# Patient Record
Sex: MALE | Race: WHITE | Employment: FULL TIME | URBAN - METROPOLITAN AREA
[De-identification: names, ages, dates, MRNs, and addresses within clinical notes are randomized per-mention and may not be internally consistent; named-entity substitution may affect disease eponyms.]

---

## 2017-04-15 DIAGNOSIS — I10 ESSENTIAL HYPERTENSION: ICD-10-CM

## 2017-04-15 DIAGNOSIS — F41.9 ANXIETY: ICD-10-CM

## 2017-04-17 RX ORDER — VALSARTAN AND HYDROCHLOROTHIAZIDE 160; 12.5 MG/1; MG/1
1 TABLET, FILM COATED ORAL DAILY
Qty: 90 TABLET | Refills: 0 | Status: SHIPPED | OUTPATIENT
Start: 2017-04-17 | End: 2017-07-07 | Stop reason: SDUPTHER

## 2017-04-17 RX ORDER — CITALOPRAM 20 MG/1
20 TABLET ORAL DAILY
Qty: 90 TABLET | Refills: 0 | Status: SHIPPED | OUTPATIENT
Start: 2017-04-17 | End: 2017-07-07 | Stop reason: SDUPTHER

## 2017-07-07 DIAGNOSIS — I10 ESSENTIAL HYPERTENSION: ICD-10-CM

## 2017-07-07 DIAGNOSIS — F41.9 ANXIETY: ICD-10-CM

## 2017-07-07 RX ORDER — CITALOPRAM 20 MG/1
20 TABLET ORAL DAILY
Qty: 90 TABLET | Refills: 0 | Status: SHIPPED | OUTPATIENT
Start: 2017-07-07 | End: 2017-08-02 | Stop reason: SDUPTHER

## 2017-07-07 RX ORDER — VALSARTAN AND HYDROCHLOROTHIAZIDE 160; 12.5 MG/1; MG/1
1 TABLET, FILM COATED ORAL DAILY
Qty: 90 TABLET | Refills: 0 | Status: SHIPPED | OUTPATIENT
Start: 2017-07-07 | End: 2017-08-02 | Stop reason: SDUPTHER

## 2017-08-02 ENCOUNTER — OFFICE VISIT (OUTPATIENT)
Dept: FAMILY MEDICINE CLINIC | Age: 47
End: 2017-08-02
Payer: COMMERCIAL

## 2017-08-02 VITALS
HEIGHT: 71 IN | HEART RATE: 80 BPM | DIASTOLIC BLOOD PRESSURE: 78 MMHG | RESPIRATION RATE: 20 BRPM | BODY MASS INDEX: 30.1 KG/M2 | SYSTOLIC BLOOD PRESSURE: 124 MMHG | TEMPERATURE: 97.5 F | WEIGHT: 215 LBS

## 2017-08-02 DIAGNOSIS — J30.1 SEASONAL ALLERGIC RHINITIS DUE TO POLLEN: ICD-10-CM

## 2017-08-02 DIAGNOSIS — F41.9 ANXIETY: ICD-10-CM

## 2017-08-02 DIAGNOSIS — R09.81 NASAL CONGESTION WITH RHINORRHEA: ICD-10-CM

## 2017-08-02 DIAGNOSIS — I10 ESSENTIAL HYPERTENSION: ICD-10-CM

## 2017-08-02 DIAGNOSIS — E78.5 HYPERLIPIDEMIA, UNSPECIFIED HYPERLIPIDEMIA TYPE: ICD-10-CM

## 2017-08-02 DIAGNOSIS — Z00.00 ANNUAL PHYSICAL EXAM: Primary | ICD-10-CM

## 2017-08-02 DIAGNOSIS — Z23 NEED FOR TDAP VACCINATION: ICD-10-CM

## 2017-08-02 DIAGNOSIS — J34.89 NASAL CONGESTION WITH RHINORRHEA: ICD-10-CM

## 2017-08-02 DIAGNOSIS — D64.9 ANEMIA, UNSPECIFIED TYPE: ICD-10-CM

## 2017-08-02 DIAGNOSIS — Z11.4 SCREENING FOR HIV (HUMAN IMMUNODEFICIENCY VIRUS): ICD-10-CM

## 2017-08-02 PROCEDURE — 99396 PREV VISIT EST AGE 40-64: CPT | Performed by: PEDIATRICS

## 2017-08-02 PROCEDURE — 90715 TDAP VACCINE 7 YRS/> IM: CPT | Performed by: PEDIATRICS

## 2017-08-02 PROCEDURE — 90471 IMMUNIZATION ADMIN: CPT | Performed by: PEDIATRICS

## 2017-08-02 RX ORDER — CITALOPRAM 20 MG/1
20 TABLET ORAL DAILY
Qty: 90 TABLET | Refills: 3 | Status: SHIPPED | OUTPATIENT
Start: 2017-08-02 | End: 2018-09-01 | Stop reason: SDUPTHER

## 2017-08-02 RX ORDER — ATORVASTATIN CALCIUM 20 MG
20 TABLET ORAL DAILY
Qty: 90 TABLET | Refills: 3 | Status: SHIPPED | OUTPATIENT
Start: 2017-08-02 | End: 2018-10-31 | Stop reason: SDUPTHER

## 2017-08-02 RX ORDER — VALSARTAN AND HYDROCHLOROTHIAZIDE 160; 12.5 MG/1; MG/1
1 TABLET, FILM COATED ORAL DAILY
Qty: 90 TABLET | Refills: 3 | Status: SHIPPED | OUTPATIENT
Start: 2017-08-02 | End: 2018-09-01 | Stop reason: SDUPTHER

## 2017-08-02 ASSESSMENT — ENCOUNTER SYMPTOMS
COUGH: 0
CONSTIPATION: 0
SHORTNESS OF BREATH: 0
DIARRHEA: 0
EYE ITCHING: 0
EYE DISCHARGE: 0
SINUS PRESSURE: 0
STRIDOR: 0
WHEEZING: 0
ABDOMINAL PAIN: 0
BLOOD IN STOOL: 0
VOICE CHANGE: 0
CHEST TIGHTNESS: 0
VOMITING: 0
BACK PAIN: 0
EYE PAIN: 0
NAUSEA: 0
COLOR CHANGE: 0
TROUBLE SWALLOWING: 0
RHINORRHEA: 1
CHOKING: 0

## 2017-08-02 ASSESSMENT — PATIENT HEALTH QUESTIONNAIRE - PHQ9
SUM OF ALL RESPONSES TO PHQ9 QUESTIONS 1 & 2: 0
1. LITTLE INTEREST OR PLEASURE IN DOING THINGS: 0
2. FEELING DOWN, DEPRESSED OR HOPELESS: 0
SUM OF ALL RESPONSES TO PHQ QUESTIONS 1-9: 0

## 2017-08-04 LAB
ALBUMIN SERPL-MCNC: 4.2 G/DL
ALP BLD-CCNC: 71 U/L
ALT SERPL-CCNC: 22 U/L
ANION GAP SERPL CALCULATED.3IONS-SCNC: 10 MMOL/L
AST SERPL-CCNC: 15 U/L
BASOPHILS ABSOLUTE: 0.1 /ΜL
BASOPHILS RELATIVE PERCENT: 0.9 %
BILIRUB SERPL-MCNC: 0.8 MG/DL (ref 0.1–1.4)
BUN BLDV-MCNC: 13 MG/DL
CALCIUM SERPL-MCNC: 9.5 MG/DL
CHLORIDE BLD-SCNC: 103 MMOL/L
CHOLESTEROL, TOTAL: 224 MG/DL
CHOLESTEROL/HDL RATIO: 5.5
CO2: 29 MMOL/L
CREAT SERPL-MCNC: 0.7 MG/DL
EOSINOPHILS ABSOLUTE: 0.2 /ΜL
EOSINOPHILS RELATIVE PERCENT: 3.5 %
FERRITIN: 162.8 NG/ML (ref 18–300)
GFR CALCULATED: 120
GLUCOSE BLD-MCNC: 99 MG/DL
HCT VFR BLD CALC: 40.8 % (ref 41–53)
HDLC SERPL-MCNC: 41 MG/DL (ref 35–70)
HEMOGLOBIN: 13.6 G/DL (ref 13.5–17.5)
IRON: 91
LDL CHOLESTEROL CALCULATED: 147 MG/DL (ref 0–160)
LYMPHOCYTES ABSOLUTE: 1.5 /ΜL
LYMPHOCYTES RELATIVE PERCENT: 27.9 %
MCH RBC QN AUTO: 30.3 PG
MCHC RBC AUTO-ENTMCNC: 33.3 G/DL
MCV RBC AUTO: 90.9 FL
MONOCYTES ABSOLUTE: 0.5 /ΜL
MONOCYTES RELATIVE PERCENT: 8.9 %
NEUTROPHILS ABSOLUTE: 3.1 /ΜL
NEUTROPHILS RELATIVE PERCENT: 58.2 %
PLATELET # BLD: 193 K/ΜL
PMV BLD AUTO: ABNORMAL FL
POTASSIUM SERPL-SCNC: 4.4 MMOL/L
RBC # BLD: 4.49 10^6/ΜL
SODIUM BLD-SCNC: 138 MMOL/L
TOTAL IRON BINDING CAPACITY: 364
TOTAL PROTEIN: 6.6
TRIGL SERPL-MCNC: 178 MG/DL
VLDLC SERPL CALC-MCNC: 36 MG/DL
WBC # BLD: 5.4 10^3/ML

## 2017-08-07 DIAGNOSIS — Z00.00 ANNUAL PHYSICAL EXAM: ICD-10-CM

## 2017-08-07 DIAGNOSIS — D64.9 ANEMIA, UNSPECIFIED TYPE: ICD-10-CM

## 2017-08-07 DIAGNOSIS — Z11.4 SCREENING FOR HIV (HUMAN IMMUNODEFICIENCY VIRUS): ICD-10-CM

## 2018-09-01 DIAGNOSIS — F41.9 ANXIETY: ICD-10-CM

## 2018-09-01 DIAGNOSIS — I10 ESSENTIAL HYPERTENSION: ICD-10-CM

## 2018-09-04 RX ORDER — CITALOPRAM 20 MG/1
20 TABLET ORAL DAILY
Qty: 90 TABLET | Refills: 3 | Status: SHIPPED | OUTPATIENT
Start: 2018-09-04 | End: 2019-07-27 | Stop reason: SDUPTHER

## 2018-09-04 RX ORDER — VALSARTAN AND HYDROCHLOROTHIAZIDE 160; 12.5 MG/1; MG/1
1 TABLET, FILM COATED ORAL DAILY
Qty: 90 TABLET | Refills: 3 | Status: SHIPPED | OUTPATIENT
Start: 2018-09-04 | End: 2019-07-27 | Stop reason: SDUPTHER

## 2018-09-04 NOTE — TELEPHONE ENCOUNTER
LOV:8-2-17  KKY:[birth year]  LRF:8-2-17  Health Maintenance   Topic Date Due    Flu vaccine (1) 09/01/2018    Lipid screen  08/04/2022    DTaP/Tdap/Td vaccine (2 - Td) 08/02/2027    HIV screen  Completed             (applicable per patient's age: Cancer Screenings, Depression Screening, Fall Risk Screening, Immunizations)    LDL Calculated (mg/dL)   Date Value   08/04/2017 147     AST (U/L)   Date Value   08/04/2017 15     ALT (U/L)   Date Value   08/04/2017 22     BUN (mg/dL)   Date Value   08/04/2017 13      (goal A1C is < 7)   (goal LDL is <100) need 30-50% reduction from baseline     BP Readings from Last 3 Encounters:   08/02/17 124/78   07/20/16 112/70   07/13/15 122/60    (goal /80)      All Future Testing planned in [facility]:      Next Visit Date:  Future Appointments  Date Time Provider Maria M Miller   10/15/2018 2:20 PM MD Radha Duncan   10/16/2019 2:20 PM MD Kelin Duncan            Patient Active Problem List:     Hyperlipidemia     Hypertension     Anxiety     Obsessive behaviors     Seasonal allergies

## 2018-10-15 ENCOUNTER — OFFICE VISIT (OUTPATIENT)
Dept: FAMILY MEDICINE CLINIC | Age: 48
End: 2018-10-15
Payer: COMMERCIAL

## 2018-10-15 VITALS
TEMPERATURE: 97.3 F | HEART RATE: 70 BPM | DIASTOLIC BLOOD PRESSURE: 74 MMHG | RESPIRATION RATE: 18 BRPM | HEIGHT: 71 IN | WEIGHT: 215 LBS | SYSTOLIC BLOOD PRESSURE: 120 MMHG | BODY MASS INDEX: 30.1 KG/M2

## 2018-10-15 DIAGNOSIS — G25.81 RESTLESS LEG SYNDROME: ICD-10-CM

## 2018-10-15 DIAGNOSIS — F41.9 ANXIETY: ICD-10-CM

## 2018-10-15 DIAGNOSIS — I10 ESSENTIAL HYPERTENSION: ICD-10-CM

## 2018-10-15 DIAGNOSIS — J30.1 SEASONAL ALLERGIC RHINITIS DUE TO POLLEN: ICD-10-CM

## 2018-10-15 DIAGNOSIS — D64.9 ANEMIA, UNSPECIFIED TYPE: ICD-10-CM

## 2018-10-15 DIAGNOSIS — Z00.00 ANNUAL PHYSICAL EXAM: Primary | ICD-10-CM

## 2018-10-15 DIAGNOSIS — E78.5 HYPERLIPIDEMIA, UNSPECIFIED HYPERLIPIDEMIA TYPE: ICD-10-CM

## 2018-10-15 DIAGNOSIS — Z12.5 SCREENING FOR PROSTATE CANCER: ICD-10-CM

## 2018-10-15 PROCEDURE — 99396 PREV VISIT EST AGE 40-64: CPT | Performed by: PEDIATRICS

## 2018-10-15 ASSESSMENT — ENCOUNTER SYMPTOMS
DIARRHEA: 0
TROUBLE SWALLOWING: 0
STRIDOR: 0
CHOKING: 0
COLOR CHANGE: 0
SHORTNESS OF BREATH: 0
CHEST TIGHTNESS: 0
BLOOD IN STOOL: 0
EYE ITCHING: 0
VOMITING: 0
NAUSEA: 0
BACK PAIN: 0
ABDOMINAL PAIN: 0
EYE DISCHARGE: 0
SINUS PRESSURE: 0
COUGH: 0
CONSTIPATION: 0
EYE PAIN: 0
VOICE CHANGE: 0
WHEEZING: 0

## 2018-10-15 ASSESSMENT — PATIENT HEALTH QUESTIONNAIRE - PHQ9
2. FEELING DOWN, DEPRESSED OR HOPELESS: 0
SUM OF ALL RESPONSES TO PHQ QUESTIONS 1-9: 0
1. LITTLE INTEREST OR PLEASURE IN DOING THINGS: 0
SUM OF ALL RESPONSES TO PHQ QUESTIONS 1-9: 0
SUM OF ALL RESPONSES TO PHQ9 QUESTIONS 1 & 2: 0

## 2018-10-15 NOTE — PROGRESS NOTES
Cardiovascular: Normal rate, regular rhythm and normal heart sounds. No murmur heard. Pulmonary/Chest: Effort normal and breath sounds normal. No stridor. No respiratory distress. He has no wheezes. He has no rales. He exhibits no tenderness. Abdominal: Soft. Bowel sounds are normal. He exhibits no distension and no mass. There is no tenderness. Musculoskeletal: Normal range of motion. He exhibits no edema or tenderness. Right knee: Normal.        Left knee: Normal.   Lymphadenopathy:     He has no cervical adenopathy. Neurological: He is alert and oriented to person, place, and time. He has normal reflexes. No cranial nerve deficit. He exhibits normal muscle tone. Coordination normal.   Skin: Skin is warm. No rash noted. He is not diaphoretic. No erythema. No pallor. Psychiatric: He has a normal mood and affect. His speech is normal and behavior is normal. Judgment and thought content normal. Cognition and memory are normal. He does not exhibit a depressed mood. Nursing note and vitals reviewed. Assessment:      Diagnosis Orders   1. Annual physical exam  CBC    Comprehensive Metabolic Panel    Lipid Panel    Urinalysis Reflex to Culture    Iron and TIBC    Ferritin    Psa screening   2. Essential hypertension     3. Hyperlipidemia, unspecified hyperlipidemia type     4. Seasonal allergic rhinitis due to pollen     5. Anxiety     6. Anemia, unspecified type  Iron and TIBC    Ferritin   7. Restless leg syndrome     8.  Screening for prostate cancer  Psa screening           Plan:       Proceed with continue same medications  Obtain fasting labs as ordered  Recommend daily exercise and healthy diet   Weight reduction encouraged  Continue ASA 81 mg once daily   Monitor blood pressure occasionally  Continue Flonase as needed  Consider Requip or Mirapex for treatment of restless leg syndrome if symptoms persist  Flu vaccine recommended - will get an work  Colonoscopy recommended and

## 2018-10-19 LAB
ABSOLUTE BASO #: 0 K/UL (ref 0–0.1)
ABSOLUTE EOS #: 0.1 K/UL (ref 0.1–0.4)
ABSOLUTE LYMPH #: 2.1 K/UL (ref 0.8–5.2)
ABSOLUTE MONO #: 0.5 K/UL (ref 0.1–0.9)
ABSOLUTE NEUT #: 3 K/UL (ref 1.3–9.1)
ALBUMIN SERPL-MCNC: 4.6 G/DL (ref 3.5–5.2)
ALK PHOSPHATASE: 70 U/L (ref 39–118)
ALT SERPL-CCNC: 27 U/L (ref 5–50)
ANION GAP SERPL CALCULATED.3IONS-SCNC: 13 MEQ/L (ref 10–19)
APPEARANCE: CLEAR
AST SERPL-CCNC: 17 U/L (ref 9–50)
BACTERIA: NEGATIVE PER HPF
BASOPHILS RELATIVE PERCENT: 0.7 %
BILIRUB SERPL-MCNC: 0.7 MG/DL
BILIRUBIN: NEGATIVE
BUN BLDV-MCNC: 12 MG/DL (ref 8–23)
CALCIUM SERPL-MCNC: 9.6 MG/DL (ref 8.5–10.5)
CHLORIDE BLD-SCNC: 100 MEQ/L (ref 95–107)
CHOLESTEROL/HDL RATIO: 5.3
CHOLESTEROL: 226 MG/DL
CO2: 27 MEQ/L (ref 19–31)
COLOR: YELLOW
CREAT SERPL-MCNC: 0.7 MG/DL (ref 0.8–1.4)
EGFR AFRICAN AMERICAN: 127.5 ML/MIN/1.73 M2
EGFR IF NONAFRICAN AMERICAN: 110 ML/MIN/1.73 M2
EOSINOPHILS RELATIVE PERCENT: 2.4 %
EPITHELIAL CELLS: NORMAL PER HPF
FERRITIN: 280.2 NG/ML (ref 30–400)
GLUCOSE BLD-MCNC: NEGATIVE MG/DL
GLUCOSE: 85 MG/DL (ref 70–99)
HCT VFR BLD CALC: 38.9 % (ref 41.4–51)
HDLC SERPL-MCNC: 43 MG/DL
HEMOGLOBIN: 13.5 G/DL (ref 13.8–17)
IRON SATURATION: 34 % (ref 20–50)
IRON, SERUM: 114 UG/DL (ref 59–158)
KETONES, URINE: NEGATIVE
LDL CHOLESTEROL CALCULATED: 142 MG/DL
LDL/HDL RATIO: 3.3
LEUKOCYTE ESTERASE, URINE: NEGATIVE
LYMPHOCYTE %: 36.3 %
MCH RBC QN AUTO: 30.6 PG (ref 27–34)
MCHC RBC AUTO-ENTMCNC: 34.7 G/DL (ref 31–36)
MCV RBC AUTO: 88.2 FL (ref 80–100)
MONOCYTES # BLD: 8 %
NEUTROPHILS RELATIVE PERCENT: 52.3 %
NITRITE, URINE: NEGATIVE
OCCULT BLOOD,URINE: ABNORMAL
PDW BLD-RTO: 12.3 % (ref 10.8–14.8)
PH: 5.5 (ref 5–9)
PLATELETS: 208 K/UL (ref 150–450)
POTASSIUM SERPL-SCNC: 3.9 MEQ/L (ref 3.5–5.4)
PROTEIN, URINE: NEGATIVE
PSA, ULTRASENSITIVE: 0.72 NG/ML
RBC: 4.41 M/UL (ref 4–5.5)
RBC: NORMAL PER HPF (ref 0–5)
SODIUM BLD-SCNC: 140 MEQ/L (ref 135–146)
SP GRAVITY MISCELLANEOUS: 1.02 (ref 1–1.03)
TOTAL IRON BINDING CAPACITY: 332 MCG/DL (ref 250–450)
TOTAL PROTEIN: 7.3 G/DL (ref 6.1–8.3)
TRIGL SERPL-MCNC: 203 MG/DL
UNSATURATED IRON BINDING CAPACITY: 218 UG/DL (ref 112–347)
UROBILINOGEN, URINE: NORMAL
VLDLC SERPL CALC-MCNC: 41 MG/DL
WBC: 5.7 K/UL (ref 3.7–10.8)
WBC: NORMAL PER HPF (ref 0–5)

## 2018-10-21 ASSESSMENT — ENCOUNTER SYMPTOMS: RHINORRHEA: 0

## 2018-10-22 DIAGNOSIS — R31.29 MICROHEMATURIA: Primary | ICD-10-CM

## 2019-07-27 DIAGNOSIS — F41.9 ANXIETY: ICD-10-CM

## 2019-07-27 DIAGNOSIS — I10 ESSENTIAL HYPERTENSION: ICD-10-CM

## 2019-07-30 RX ORDER — VALSARTAN AND HYDROCHLOROTHIAZIDE 160; 12.5 MG/1; MG/1
1 TABLET, FILM COATED ORAL DAILY
Qty: 90 TABLET | Refills: 3 | Status: SHIPPED | OUTPATIENT
Start: 2019-07-30 | End: 2020-04-15

## 2019-07-30 RX ORDER — CITALOPRAM 20 MG/1
20 TABLET ORAL DAILY
Qty: 90 TABLET | Refills: 3 | Status: SHIPPED | OUTPATIENT
Start: 2019-07-30 | End: 2020-04-15

## 2019-10-16 ENCOUNTER — OFFICE VISIT (OUTPATIENT)
Dept: FAMILY MEDICINE CLINIC | Age: 49
End: 2019-10-16
Payer: COMMERCIAL

## 2019-10-16 VITALS
WEIGHT: 215 LBS | RESPIRATION RATE: 18 BRPM | BODY MASS INDEX: 29.99 KG/M2 | TEMPERATURE: 97.4 F | DIASTOLIC BLOOD PRESSURE: 80 MMHG | SYSTOLIC BLOOD PRESSURE: 116 MMHG | HEART RATE: 76 BPM

## 2019-10-16 DIAGNOSIS — D64.9 ANEMIA, UNSPECIFIED TYPE: ICD-10-CM

## 2019-10-16 DIAGNOSIS — J30.1 SEASONAL ALLERGIC RHINITIS DUE TO POLLEN: ICD-10-CM

## 2019-10-16 DIAGNOSIS — F41.9 ANXIETY: ICD-10-CM

## 2019-10-16 DIAGNOSIS — Z12.5 SCREENING FOR PROSTATE CANCER: ICD-10-CM

## 2019-10-16 DIAGNOSIS — Z00.00 ANNUAL PHYSICAL EXAM: Primary | ICD-10-CM

## 2019-10-16 DIAGNOSIS — I10 ESSENTIAL HYPERTENSION: ICD-10-CM

## 2019-10-16 DIAGNOSIS — E78.5 HYPERLIPIDEMIA, UNSPECIFIED HYPERLIPIDEMIA TYPE: ICD-10-CM

## 2019-10-16 PROCEDURE — 99396 PREV VISIT EST AGE 40-64: CPT | Performed by: PEDIATRICS

## 2019-10-16 RX ORDER — ATORVASTATIN CALCIUM 20 MG
20 TABLET ORAL DAILY
Qty: 90 TABLET | Refills: 3 | Status: SHIPPED | OUTPATIENT
Start: 2019-10-16 | End: 2020-04-13 | Stop reason: SDUPTHER

## 2019-10-16 ASSESSMENT — PATIENT HEALTH QUESTIONNAIRE - PHQ9
1. LITTLE INTEREST OR PLEASURE IN DOING THINGS: 0
SUM OF ALL RESPONSES TO PHQ9 QUESTIONS 1 & 2: 0
2. FEELING DOWN, DEPRESSED OR HOPELESS: 0
SUM OF ALL RESPONSES TO PHQ QUESTIONS 1-9: 0
SUM OF ALL RESPONSES TO PHQ QUESTIONS 1-9: 0

## 2019-10-16 ASSESSMENT — ENCOUNTER SYMPTOMS
STRIDOR: 0
NAUSEA: 0
COUGH: 0
EYE PAIN: 0
ABDOMINAL PAIN: 0
WHEEZING: 0
CHEST TIGHTNESS: 0
VOMITING: 0
BLOOD IN STOOL: 0
EYE DISCHARGE: 0
CONSTIPATION: 0
CHOKING: 0
BACK PAIN: 0
SHORTNESS OF BREATH: 0
TROUBLE SWALLOWING: 0
VOICE CHANGE: 0
EYE ITCHING: 0
COLOR CHANGE: 0
RHINORRHEA: 0
SINUS PRESSURE: 0
DIARRHEA: 0

## 2019-10-18 LAB
ABSOLUTE BASO #: 0 X10E9/L (ref 0–0.9)
ABSOLUTE EOS #: 0.1 X10E9/L (ref 0–0.4)
ABSOLUTE LYMPH #: 1.9 X10E9/L (ref 1–3.5)
ABSOLUTE MONO #: 0.4 X10E9/L (ref 0–0.9)
ABSOLUTE NEUT #: 2.7 X10E9/L (ref 1.5–6.6)
ALBUMIN SERPL-MCNC: 4.5 G/DL (ref 3.2–5.3)
ALK PHOSPHATASE: 70 U/L (ref 39–130)
ALT SERPL-CCNC: 26 U/L (ref 0–40)
ANION GAP SERPL CALCULATED.3IONS-SCNC: 7 MMOL/L (ref 4–12)
APPEARANCE: CLEAR
AST SERPL-CCNC: 19 U/L (ref 0–41)
BASOPHILS RELATIVE PERCENT: 0.9 %
BILIRUB SERPL-MCNC: 0.8 MG/DL (ref 0.3–1.2)
BILIRUBIN: NEGATIVE
BUN BLDV-MCNC: 16 MG/DL (ref 5–23)
CALCIUM SERPL-MCNC: 9.5 MG/DL (ref 8.5–10.5)
CHLORIDE BLD-SCNC: 102 MMOL/L (ref 98–109)
CHOLESTEROL/HDL RATIO: 4.6 (ref 1–5)
CHOLESTEROL: 192 MG/DL (ref 150–200)
CO2: 30 MMOL/L (ref 22–32)
COLOR: YELLOW
CREAT SERPL-MCNC: 0.72 MG/DL (ref 0.6–1.3)
EGFR AFRICAN AMERICAN: >60 ML/MIN/1.73SQ.M
EGFR IF NONAFRICAN AMERICAN: >60 ML/MIN/1.73SQ.M
EOSINOPHILS RELATIVE PERCENT: 2.4 %
FERRITIN: 153 NG/ML (ref 24–336)
GLUCOSE BLD-MCNC: NEGATIVE MG/DL
GLUCOSE: 81 MG/DL (ref 65–99)
HCT VFR BLD CALC: 40.4 % (ref 39–49)
HDLC SERPL-MCNC: 42 MG/DL
HEMOGLOBIN: 13.9 G/DL (ref 13.1–17.3)
IRON SATURATION: 24 % SATURATION (ref 20–50)
IRON, SERUM: 102 UG/DL (ref 50–212)
KETONES, URINE: NEGATIVE MG/DL
LDL CHOLESTEROL CALCULATED: 119 MG/DL
LDL/HDL RATIO: 2.8
LEUKOCYTE ESTERASE, URINE: NEGATIVE
LYMPHOCYTE %: 36.8 %
MCH RBC QN AUTO: 32 PG (ref 27–35)
MCHC RBC AUTO-ENTMCNC: 34.5 G/DL (ref 32–36)
MCV RBC AUTO: 93 FL (ref 81–101)
MONOCYTES # BLD: 7.1 %
NEUTROPHILS RELATIVE PERCENT: 52.8 %
NITRITE, URINE: NEGATIVE
OCCULT BLOOD,URINE: NEGATIVE
PDW BLD-RTO: 13.1 % (ref 11.4–14.3)
PH: 6 (ref 5–8.5)
PLATELETS: 219 X10E9/L (ref 150–450)
PMV BLD AUTO: 9.5 FL (ref 7–12)
POTASSIUM SERPL-SCNC: 3.9 MMOL/L (ref 3.5–5)
PROTEIN, URINE: NEGATIVE MG/DL
PSA, ULTRASENSITIVE: 0.41 NG/ML (ref 0–4)
RBC: 4.36 X10E12/L (ref 4.25–5.65)
SITE/TYPE: NORMAL
SODIUM BLD-SCNC: 139 MMOL/L (ref 134–146)
SP GRAVITY MISCELLANEOUS: 1.01 (ref 1–1.03)
TOTAL IRON BINDING CAPACITY: 428 UG/DL (ref 250–425)
TOTAL PROTEIN: 6.7 G/DL (ref 6–8)
TRIGL SERPL-MCNC: 155 MG/DL (ref 27–150)
UROBILINOGEN, URINE: 0.2 EU/DL
VLDLC SERPL CALC-MCNC: 31 MG/DL (ref 0–30)
WBC: 5.2 X10E9/L (ref 4.8–10.8)

## 2019-10-20 ASSESSMENT — ENCOUNTER SYMPTOMS: ROS SKIN COMMENTS: SEVERAL MOLES

## 2020-04-13 RX ORDER — ATORVASTATIN CALCIUM 20 MG/1
20 TABLET, FILM COATED ORAL DAILY
Qty: 90 TABLET | Refills: 1 | Status: SHIPPED | OUTPATIENT
Start: 2020-04-13 | End: 2020-09-28 | Stop reason: SDUPTHER

## 2020-04-15 RX ORDER — CITALOPRAM 20 MG/1
20 TABLET ORAL DAILY
Qty: 90 TABLET | Refills: 1 | Status: SHIPPED | OUTPATIENT
Start: 2020-04-15 | End: 2020-11-18

## 2020-04-15 RX ORDER — VALSARTAN AND HYDROCHLOROTHIAZIDE 160; 12.5 MG/1; MG/1
1 TABLET, FILM COATED ORAL DAILY
Qty: 90 TABLET | Refills: 1 | Status: SHIPPED | OUTPATIENT
Start: 2020-04-15 | End: 2020-11-17

## 2020-08-27 ENCOUNTER — OFFICE VISIT (OUTPATIENT)
Dept: FAMILY MEDICINE CLINIC | Age: 50
End: 2020-08-27
Payer: COMMERCIAL

## 2020-08-27 VITALS
RESPIRATION RATE: 18 BRPM | HEART RATE: 66 BPM | SYSTOLIC BLOOD PRESSURE: 124 MMHG | BODY MASS INDEX: 30.4 KG/M2 | TEMPERATURE: 96.3 F | DIASTOLIC BLOOD PRESSURE: 84 MMHG | WEIGHT: 218 LBS

## 2020-08-27 PROCEDURE — 99214 OFFICE O/P EST MOD 30 MIN: CPT | Performed by: PEDIATRICS

## 2020-08-27 PROCEDURE — 96372 THER/PROPH/DIAG INJ SC/IM: CPT | Performed by: PEDIATRICS

## 2020-08-27 RX ORDER — METHYLPREDNISOLONE 4 MG/1
TABLET ORAL
Qty: 1 KIT | Refills: 0 | Status: SHIPPED | OUTPATIENT
Start: 2020-08-27 | End: 2020-09-02

## 2020-08-27 RX ORDER — METHYLPREDNISOLONE ACETATE 80 MG/ML
80 INJECTION, SUSPENSION INTRA-ARTICULAR; INTRALESIONAL; INTRAMUSCULAR; SOFT TISSUE ONCE
Status: COMPLETED | OUTPATIENT
Start: 2020-08-27 | End: 2020-08-27

## 2020-08-27 RX ORDER — BACLOFEN 5 MG/1
2 TABLET ORAL 3 TIMES DAILY
COMMUNITY
Start: 2020-08-26 | End: 2020-10-19 | Stop reason: ALTCHOICE

## 2020-08-27 RX ADMIN — METHYLPREDNISOLONE ACETATE 80 MG: 80 INJECTION, SUSPENSION INTRA-ARTICULAR; INTRALESIONAL; INTRAMUSCULAR; SOFT TISSUE at 17:34

## 2020-08-27 ASSESSMENT — ENCOUNTER SYMPTOMS
PHOTOPHOBIA: 0
ABDOMINAL PAIN: 0
CONSTIPATION: 0
COLOR CHANGE: 0
SHORTNESS OF BREATH: 0
COUGH: 0
VOMITING: 0
DIARRHEA: 0
STRIDOR: 0
EYE PAIN: 0
EYE REDNESS: 0
WHEEZING: 0
EYE DISCHARGE: 0
BACK PAIN: 1
CHEST TIGHTNESS: 0
BLOOD IN STOOL: 0

## 2020-08-27 ASSESSMENT — PATIENT HEALTH QUESTIONNAIRE - PHQ9
SUM OF ALL RESPONSES TO PHQ QUESTIONS 1-9: 0
1. LITTLE INTEREST OR PLEASURE IN DOING THINGS: 0
SUM OF ALL RESPONSES TO PHQ9 QUESTIONS 1 & 2: 0
2. FEELING DOWN, DEPRESSED OR HOPELESS: 0
SUM OF ALL RESPONSES TO PHQ QUESTIONS 1-9: 0

## 2020-08-27 NOTE — LETTER
[facility]  [address] [address]  Phone: [phone]  Fax: [phone]    Joe Jewell MD        August 27, 2020     Patient: Ingrid Leavitt   YOB: 1970   Date of Visit: 8/27/2020       To Whom It May Concern: It is my medical opinion that Cherre Parents should remain out of work until 9/13/20. Please excuse him from 8/26/20 through 9/13/20 with anticipated return to work date of 9/14/20. If you have any questions or concerns, please don't hesitate to call.     Sincerely,          Joe Jewell MD

## 2020-08-27 NOTE — PROGRESS NOTES
Medication Sig Dispense Refill    Baclofen (LIORESAL) 5 MG tablet Take 2 tablets by mouth 3 times daily      NAPROXEN 500 MG EC tablet Take 1 tablet by mouth 2 times daily      methylPREDNISolone (MEDROL, ILENE,) 4 MG tablet Take by mouth as instructed by packet. 1 kit 0    citalopram (CELEXA) 20 MG tablet Take 1 tablet by mouth daily 90 tablet 1    valsartan-hydrochlorothiazide (DIOVAN-HCT) 160-12.5 MG per tablet Take 1 tablet by mouth daily 90 tablet 1    atorvastatin (LIPITOR) 20 MG tablet Take 1 tablet by mouth daily 90 tablet 1    CRANBERRY EXTRACT PO Take 4,200 mg by mouth daily      vitamin B-12 (CYANOCOBALAMIN) 500 MCG tablet Take 500 mcg by mouth daily.  aspirin 81 MG tablet Take 81 mg by mouth daily.  Omega-3 Fatty Acids (FISH OIL) 1000 MG CPDR Take  by mouth. No current facility-administered medications for this visit. HPI    Patient presents today for evaluation of low back pain that started yesterday. Patient states that he was at work and was bending down to  a tin and he felt a sharp pain in the right lower back with radiation down the right leg. He did feel a popping sensation in the right lower back prior to the pain radiating down his leg. He states that this sharp pain lasted for approximately 15 minutes but he was unable to walk because of right leg weakness. He did also have some right lower back spasms. He was taken to the emergency room for evaluation. He did have a CT scan that showed degenerative disc disease as well as arthritic changes of the spine with facet arthropathy and disc bulges with right paracentral protrusion at L4/L5 and L5/S1. He was given prescriptions for 7 days of naproxen and 10 days of baclofen which he has been taking. He does feel mild improvement in his back pain today but still has difficulty walking and has been using a crutch for support.   He was given a prescription for Percocet but he has not needed to use this as of

## 2020-09-08 ENCOUNTER — TELEPHONE (OUTPATIENT)
Dept: FAMILY MEDICINE CLINIC | Age: 50
End: 2020-09-08

## 2020-09-08 NOTE — TELEPHONE ENCOUNTER
Patient called with worsening of low back symptoms with radiculopathy. We had discussed at his office appointment that he may require a neurosurgical consultation. Neurosurgical consultation placed for [facility], Dr. Sarah Fitzpatrick. Please fax referral for urgent referral with my last office note and CT scan as well as emergency room reports.  Please highlight urgent or place urgent on referral.

## 2020-09-28 RX ORDER — ATORVASTATIN CALCIUM 20 MG/1
20 TABLET, FILM COATED ORAL DAILY
Qty: 90 TABLET | Refills: 1 | Status: SHIPPED | OUTPATIENT
Start: 2020-09-28 | End: 2021-03-29

## 2020-09-28 NOTE — TELEPHONE ENCOUNTER
LOV 10-16-19  LRF 4-13-20    Health Maintenance   Topic Date Due    Shingles Vaccine (1 of 2) 09/21/2018    Colon cancer screen colonoscopy  09/21/2018    Flu vaccine (1) 09/01/2020    Lipid screen  10/17/2020    Potassium monitoring  10/17/2020    Creatinine monitoring  10/17/2020    DTaP/Tdap/Td vaccine (2 - Td) 08/02/2027    HIV screen  Completed    Hepatitis A vaccine  Aged Out    Hepatitis B vaccine  Aged Out    Hib vaccine  Aged Out    Meningococcal (ACWY) vaccine  Aged Out    Pneumococcal 0-64 years Vaccine  Aged Out             (applicable per patient's age: Cancer Screenings, Depression Screening, Fall Risk Screening, Immunizations)    LDL Calculated (mg/dL)   Date Value   10/17/2019 119     AST (U/L)   Date Value   10/17/2019 19     ALT (U/L)   Date Value   10/17/2019 26     BUN (mg/dL)   Date Value   10/17/2019 16      (goal A1C is < 7)   (goal LDL is <100) need 30-50% reduction from baseline     BP Readings from Last 3 Encounters:   08/27/20 124/84   10/16/19 116/80   10/15/18 120/74    (goal /80)      All Future Testing planned in [facility]:  Lab Frequency Next Occurrence   Lipid Panel Once 10/18/2019   Comprehensive Metabolic Panel Once 54/43/6302   Psa screening Once 10/18/2019   CBC Once 10/18/2019   Ferritin Once 10/18/2019   Iron and TIBC Once 10/18/2019   Urinalysis Reflex to Culture Once 10/18/2019       Next Visit Date:  Future Appointments   Date Time Provider Maria M Miller   10/19/2020  3:20 PM MD Radha Valdez            Patient Active Problem List:     Hyperlipidemia     Hypertension     Anxiety     Obsessive behaviors     Seasonal allergies

## 2020-10-19 ENCOUNTER — OFFICE VISIT (OUTPATIENT)
Dept: FAMILY MEDICINE CLINIC | Age: 50
End: 2020-10-19
Payer: COMMERCIAL

## 2020-10-19 VITALS
HEART RATE: 69 BPM | WEIGHT: 217.8 LBS | SYSTOLIC BLOOD PRESSURE: 112 MMHG | TEMPERATURE: 97.6 F | BODY MASS INDEX: 30.49 KG/M2 | DIASTOLIC BLOOD PRESSURE: 82 MMHG | HEIGHT: 71 IN

## 2020-10-19 PROCEDURE — 99396 PREV VISIT EST AGE 40-64: CPT | Performed by: PEDIATRICS

## 2020-10-19 NOTE — PROGRESS NOTES
Sig Dispense Refill    atorvastatin (LIPITOR) 20 MG tablet Take 1 tablet by mouth daily 90 tablet 1    citalopram (CELEXA) 20 MG tablet Take 1 tablet by mouth daily 90 tablet 1    valsartan-hydrochlorothiazide (DIOVAN-HCT) 160-12.5 MG per tablet Take 1 tablet by mouth daily 90 tablet 1    CRANBERRY EXTRACT PO Take 4,200 mg by mouth daily      vitamin B-12 (CYANOCOBALAMIN) 500 MCG tablet Take 500 mcg by mouth daily.  aspirin 81 MG tablet Take 81 mg by mouth daily.  Omega-3 Fatty Acids (FISH OIL) 1000 MG CPDR Take  by mouth. No current facility-administered medications for this visit. HPI    Patient presents today for routine yearly physical.  He does have a history of hypertension, hyperlipidemia, seasonal allergies and anxiety with some obsessive behaviors in the past.  He does take Diovan for his blood pressure and this controls his blood pressures well. He does take Lipitor approximately 3-4 times per week and is tolerating this well without side effects. His seasonal allergies have been well controlled without his regular allergy shots that he has taken in the past for many years but he has developed some allergy symptoms including congestion and occasional cough. These injections were previously ordered by his ENT physician in [facility]. He does occasionally use an antihistamine and Flonase. He does wonder if he should go back to his ENT as his allergies have been bothering him somewhat. He wonders if things at his work cause his allergies to act up. I did recommend that he get back on his antihistamine and Flonase and if this is not helpful then he should return to see his ENT physician. He does take Celexa for his history of anxiety and this works well to control his symptoms. He did previously take lorazepam in the past however he has not taken this for many years and he states he is doing well without it.   He does have a history of having a very mild anemia for the last several years without any signs of active bleeding. His last blood counts were normal.  He does have a history of microhematuria in the past but this has been negative for the past several years. He was seen recently with acute low back pain with radiation into the right leg. He was evaluated by neurosurgery and was prescribed physical therapy and he states that he has been doing very well. He no longer has any back pain. He does have a black mole on the left thigh that he has had for many years. This is slightly irregular and it has been recommended that he have this biopsied in the past.  He is ready to have this done and will get this scheduled for the next several weeks. He does have a mole on his right foot that is unchanged. He has no other concerns at this time. cmw      Review of Systems   Constitutional: Negative for appetite change, diaphoresis, fatigue and fever. HENT: Negative for congestion, ear discharge, ear pain, hearing loss, rhinorrhea, sinus pressure, sneezing, trouble swallowing and voice change. Seasonal allergies controlled with as needed flonase. Eyes: Negative for pain, discharge, itching and visual disturbance. Respiratory: Negative for cough, choking, chest tightness, shortness of breath, wheezing and stridor. Cardiovascular: Negative for chest pain, palpitations and leg swelling. Gastrointestinal: Negative for abdominal pain, blood in stool, constipation, diarrhea, nausea and vomiting. Endocrine: Negative for polydipsia and polyphagia. Genitourinary: Negative for decreased urine volume, difficulty urinating, dysuria, frequency, hematuria and urgency. History of microhematuria. Musculoskeletal: Negative for arthralgias, back pain, gait problem, joint swelling, myalgias and neck pain. Skin: Negative for color change, pallor, rash and wound. Several moles   Allergic/Immunologic: Negative for immunocompromised state.    Neurological: Negative for dizziness, tremors, syncope, speech difficulty, weakness, light-headedness, numbness and headaches. Hematological: Negative for adenopathy. Does not bruise/bleed easily. History of very mild anemia   Psychiatric/Behavioral: Negative for decreased concentration, dysphoric mood, self-injury and sleep disturbance. The patient is nervous/anxious (well controlled with celexa). Objective:     /82 (Site: Left Upper Arm, Position: Sitting, Cuff Size: Large Adult)   Pulse 69   Temp 97.6 °F (36.4 °C) (Temporal)   Ht 5' 11\" (1.803 m)   Wt 217 lb 12.8 oz (98.8 kg)   BMI 30.38 kg/m²      Physical Exam  Vitals signs and nursing note reviewed. Constitutional:       General: He is not in acute distress. Appearance: He is well-developed. He is not diaphoretic. HENT:      Head: Normocephalic. Right Ear: External ear normal.      Left Ear: External ear normal.      Nose: Nose normal.      Mouth/Throat:      Pharynx: No oropharyngeal exudate. Eyes:      General: No scleral icterus. Right eye: No discharge. Left eye: No discharge. Conjunctiva/sclera: Conjunctivae normal.      Pupils: Pupils are equal, round, and reactive to light. Neck:      Musculoskeletal: Normal range of motion and neck supple. Thyroid: No thyromegaly. Vascular: No JVD. Cardiovascular:      Rate and Rhythm: Normal rate and regular rhythm. Heart sounds: Normal heart sounds. No murmur. Pulmonary:      Effort: Pulmonary effort is normal. No respiratory distress. Breath sounds: Normal breath sounds. No stridor. No wheezing or rales. Chest:      Chest wall: No tenderness. Abdominal:      General: Bowel sounds are normal. There is no distension. Palpations: Abdomen is soft. There is no mass. Tenderness: There is no abdominal tenderness. Musculoskeletal: Normal range of motion. General: No tenderness.       Right knee: Normal.      Left knee: Normal. Lymphadenopathy:      Cervical: No cervical adenopathy. Skin:     General: Skin is warm. Capillary Refill: Capillary refill takes less than 2 seconds. Coloration: Skin is not pale. Findings: No erythema or rash. Neurological:      Mental Status: He is alert and oriented to person, place, and time. Cranial Nerves: No cranial nerve deficit. Motor: No abnormal muscle tone. Coordination: Coordination normal.      Deep Tendon Reflexes: Reflexes are normal and symmetric. Psychiatric:         Mood and Affect: Mood is not depressed. Speech: Speech normal.         Behavior: Behavior normal.         Thought Content: Thought content normal.         Judgment: Judgment normal.         Assessment:      Diagnosis Orders   1. Annual physical exam  Lipid Panel    Comprehensive Metabolic Panel    CBC   2. Essential hypertension     3. Hyperlipidemia, unspecified hyperlipidemia type     4. Seasonal allergic rhinitis due to pollen     5. Anxiety     6. Anemia, unspecified type     7. History of anemia  Iron and TIBC    Ferritin   8. Acute right lumbar radiculopathy     9. Neoplasm of uncertain behavior of skin of thigh     10. Screening PSA (prostate specific antigen)  Psa screening   11.  Screening for colorectal cancer  Cologuard (For External Results Only)         Plan:     Proceed with continue current medications  Obtain labs as ordered  Recommend daily exercise / healthy diet  Take Claritin or Allegra over-the-counter with Flonase regularly  Consider return to ENT for allergy injections if allergy symptoms persist  Continue Celexa  Continue stretching exercises for low back pain that is now resolved  Shave biopsy of mole on the left thigh  Cologuard  Shingles vaccine recommended  Call with concerns        Kim Tobias received counseling on the following healthy behaviors: nutrition, exercise and medication adherence  Reviewed prior labs and health maintenance  Continue current medications, diet and exercise. Discussed use, benefit, and side effects of prescribed medications. Barriers to medication compliance addressed. Patient given educational materials - see patient instructions  Was a self-tracking handout given in paper form or via [employer]? No    Requested Prescriptions      No prescriptions requested or ordered in this encounter       All patient questions answered. Patient voiced understanding. Quality Measures    Body mass index is 30.38 kg/m². Elevated. Weight control planned discussed Healthy diet and regular exercise. BP: 112/82 Blood pressure is normal. Treatment plan consists of No treatment change needed.     Lab Results   Component Value Date    [address] 119 10/17/2019    (goal LDL reduction with dx if diabetes is 50% LDL reduction)      PHQ Scores 8/27/2020 10/16/2019 10/15/2018 8/2/2017   PHQ2 Score 0 0 0 0   PHQ9 Score 0 0 0 0     Interpretation of Total Score Depression Severity: 1-4 = Minimal depression, 5-9 = Mild depression, 10-14 = Moderate depression, 15-19 = Moderately severe depression, 20-27 = Severe depression      Electronically signed by Ever Oh MD on 10/20/2020 at 11:14 PM

## 2020-10-20 ASSESSMENT — ENCOUNTER SYMPTOMS
TROUBLE SWALLOWING: 0
VOICE CHANGE: 0
NAUSEA: 0
DIARRHEA: 0
RHINORRHEA: 0
STRIDOR: 0
COLOR CHANGE: 0
ABDOMINAL PAIN: 0
VOMITING: 0
WHEEZING: 0
SHORTNESS OF BREATH: 0
CONSTIPATION: 0
EYE ITCHING: 0
EYE DISCHARGE: 0
COUGH: 0
BACK PAIN: 0
SINUS PRESSURE: 0
CHEST TIGHTNESS: 0
BLOOD IN STOOL: 0
ROS SKIN COMMENTS: SEVERAL MOLES
EYE PAIN: 0
CHOKING: 0

## 2020-11-07 LAB
ABSOLUTE BASO #: 0 X10E9/L (ref 0–0.2)
ABSOLUTE EOS #: 0.1 X10E9/L (ref 0–0.4)
ABSOLUTE LYMPH #: 1.6 X10E9/L (ref 1–3.5)
ABSOLUTE MONO #: 0.5 X10E9/L (ref 0–0.9)
ABSOLUTE NEUT #: 3.7 X10E9/L (ref 1.5–6.6)
ALBUMIN SERPL-MCNC: 3.7 G/DL (ref 3.2–5.3)
ALK PHOSPHATASE: 60 U/L (ref 39–130)
ALT SERPL-CCNC: 23 U/L (ref 0–40)
ANION GAP SERPL CALCULATED.3IONS-SCNC: 12 MMOL/L (ref 5–15)
AST SERPL-CCNC: 19 U/L (ref 0–41)
BASOPHILS RELATIVE PERCENT: 0.4 %
BILIRUB SERPL-MCNC: 0.6 MG/DL (ref 0.3–1.2)
BUN BLDV-MCNC: 15 MG/DL (ref 5–23)
CALCIUM SERPL-MCNC: 9 MG/DL (ref 8.5–10.5)
CHLORIDE BLD-SCNC: 104 MMOL/L (ref 98–109)
CHOLESTEROL/HDL RATIO: 4 (ref 1–5)
CHOLESTEROL: 140 MG/DL (ref 150–200)
CO2: 28 MMOL/L (ref 22–32)
CREAT SERPL-MCNC: 0.65 MG/DL (ref 0.6–1.3)
EGFR AFRICAN AMERICAN: >60 ML/MIN/1.73SQ.M
EGFR IF NONAFRICAN AMERICAN: >60 ML/MIN/1.73SQ.M
EOSINOPHILS RELATIVE PERCENT: 1.2 %
FERRITIN: 292 NG/ML (ref 24–336)
GLUCOSE: 78 MG/DL (ref 65–99)
HCT VFR BLD CALC: 33.1 % (ref 39–49)
HDLC SERPL-MCNC: 35 MG/DL
HEMOGLOBIN: 11.3 G/DL (ref 13–17)
IRON SATURATION: 15 % SATURATION (ref 20–50)
IRON, SERUM: 41 UG/DL (ref 50–212)
LDL CHOLESTEROL CALCULATED: 83 MG/DL
LDL/HDL RATIO: 2.4
LYMPHOCYTE %: 26.7 %
MCH RBC QN AUTO: 31 PG (ref 27–34)
MCHC RBC AUTO-ENTMCNC: 34.2 G/DL (ref 32–36)
MCV RBC AUTO: 91 FL (ref 80–100)
MONOCYTES # BLD: 8.3 %
NEUTROPHILS RELATIVE PERCENT: 63.4 %
PDW BLD-RTO: 12.7 % (ref 11.5–15)
PLATELETS: 265 X10E9/L (ref 150–450)
PMV BLD AUTO: 8.4 FL (ref 7–12)
POTASSIUM SERPL-SCNC: 3.4 MMOL/L (ref 3.5–5)
PSA, ULTRASENSITIVE: 0.49 NG/ML (ref 0–4)
RBC: 3.64 X10E12/L (ref 4.1–5.7)
SODIUM BLD-SCNC: 144 MMOL/L (ref 134–146)
TOTAL IRON BINDING CAPACITY: 273 UG/DL (ref 250–425)
TOTAL PROTEIN: 6.7 G/DL (ref 6–8)
TRIGL SERPL-MCNC: 108 MG/DL (ref 27–150)
VLDLC SERPL CALC-MCNC: 22 MG/DL (ref 0–30)
WBC: 5.8 X10E9/L (ref 4–11)

## 2020-11-09 ENCOUNTER — OFFICE VISIT (OUTPATIENT)
Dept: FAMILY MEDICINE CLINIC | Age: 50
End: 2020-11-09
Payer: COMMERCIAL

## 2020-11-09 ENCOUNTER — HOSPITAL ENCOUNTER (OUTPATIENT)
Age: 50
Setting detail: SPECIMEN
Discharge: HOME | End: 2020-11-09
Payer: COMMERCIAL

## 2020-11-09 VITALS
SYSTOLIC BLOOD PRESSURE: 122 MMHG | TEMPERATURE: 98.2 F | DIASTOLIC BLOOD PRESSURE: 66 MMHG | HEART RATE: 63 BPM | RESPIRATION RATE: 16 BRPM

## 2020-11-09 PROCEDURE — 99213 OFFICE O/P EST LOW 20 MIN: CPT | Performed by: PEDIATRICS

## 2020-11-09 PROCEDURE — 11104 PUNCH BX SKIN SINGLE LESION: CPT | Performed by: PEDIATRICS

## 2020-11-09 NOTE — PROGRESS NOTES
Subjective:      Patient ID: Sanju Aguilar is a 44 y.o. male. Visit Information    Have you changed or started any medications since your last visit including any over-the-counter medicines, vitamins, or herbal medicines? no   Are you having any side effects from any of your medications? -  no  Have you stopped taking any of your medications? Is so, why? -  no    Have you seen any other physician or provider since your last visit? No  Have you had any other diagnostic tests since your last visit? No  Have you been seen in the emergency room and/or had an admission to a hospital since we last saw you? No  Have you had your routine dental cleaning in the past 6 months? yes -     Have you activated your [facility] account? If not, what are your barriers?  Yes     Patient Care Team:  Benton Vasquez MD as PCP - General (Pediatrics)  Benton Vasquez MD as PCP - [facility] Provider    Medical History Review  Past Medical, Family, and Social History reviewed and does contribute to the patient presenting condition    Health Maintenance   Topic Date Due    Shingles Vaccine (1 of 2) 09/21/2018    Colon cancer screen colonoscopy  09/21/2018    Lipid screen  11/06/2021    Potassium monitoring  11/06/2021    Creatinine monitoring  11/06/2021    DTaP/Tdap/Td vaccine (2 - Td) 08/02/2027    Flu vaccine  Completed    HIV screen  Completed    Hepatitis A vaccine  Aged Out    Hepatitis B vaccine  Aged Out    Hib vaccine  Aged Out    Meningococcal (ACWY) vaccine  Aged Out    Pneumococcal 0-64 years Vaccine  Aged Out       [facility] Scores 8/27/2020 10/16/2019 10/15/2018 8/2/2017   PHQ2 Score 0 0 0 0   PHQ9 Score 0 0 0 0     Interpretation of Total Score DepressionSeverity: 1-4 = Minimal depression, 5-9 = Mild depression, 10-14 = Moderate depression, 15-19 = Moderately severe depression, 20-27 = Severe depression    Current Outpatient Medications   Medication Sig Dispense Refill    mupirocin (Kuldeep Iyre) 2 % ointment Apply 3 times daily. 3 g 0    atorvastatin (LIPITOR) 20 MG tablet Take 1 tablet by mouth daily 90 tablet 1    citalopram (CELEXA) 20 MG tablet Take 1 tablet by mouth daily 90 tablet 1    valsartan-hydrochlorothiazide (DIOVAN-HCT) 160-12.5 MG per tablet Take 1 tablet by mouth daily 90 tablet 1    CRANBERRY EXTRACT PO Take 4,200 mg by mouth daily      vitamin B-12 (CYANOCOBALAMIN) 500 MCG tablet Take 500 mcg by mouth daily.  aspirin 81 MG tablet Take 81 mg by mouth daily.  Omega-3 Fatty Acids (FISH OIL) 1000 MG CPDR Take  by mouth. No current facility-administered medications for this visit. HPI    Patient presents today for biopsy of skin lesion on the left thigh that has been darkening and enlarging. This has been present for many years with recent change. He did also recently have his routine blood work done and this was reviewed with him today. He does have an iron deficiency anemia that was not present for the last several years. He does have a history of having a very mild anemia in the past.  Interestingly he does have a family history of iron deficiency anemia of unknown type in his mother and aunt. He has never seen a hematologist.  He denies any blood in his stool or blood in his urine. He has not had a colorectal cancer screening done because he has not wanted to do this. He was sent to [facility] last month after his physical and he does have this at home. He was encouraged to have this done. I did also explain to him the importance of working up an iron deficiency anemia in someone his age in the absence of any bleeding. I did tell him that this can indicate that there could be a colon cancer or colon polyp. He did also have a recent illness as outlined below - this possibly could have played a part in this iron deficiency anemia as well. He does also complain of a persistent cough for the last year.   He was thinking this was related to his allergies or possibly something he exposed to at work. He now does wonder if possibly this may be secondary to his valsartan. I did tell him that even though this is less likely that there is always a possibility that an ARB can cause a dry cough. He did also recently have a mild illness - possibly COVID as he has been exposed to this at work. He was not tested for this but reports he just didn't feel well for a few days. He had a low grade fever 99s, fatigue and decreased appetite. He is now feeling better. He is going to discontinue his diovan for several days and monitor his cough. He will also monitor his blood pressure and if his blood pressures elevate he will call to let me know and we will come up with an alternate medication to treat his hypertension. cmw      Review of Systems   Constitutional: Negative for appetite change, fatigue and fever. Respiratory: Positive for cough. Negative for shortness of breath, wheezing and stridor. Cardiovascular: Negative for chest pain, palpitations and leg swelling. Endocrine: Negative for polydipsia, polyphagia and polyuria. Skin: Positive for color change (skin lesion left thigh). Negative for rash. Allergic/Immunologic: Negative for immunocompromised state. Neurological: Negative for dizziness, light-headedness and headaches. Hematological: Negative for adenopathy. Does not bruise/bleed easily. Objective:     /66 (Site: Right Upper Arm, Position: Sitting, Cuff Size: Medium Adult)   Pulse 63   Temp 98.2 °F (36.8 °C) (Temporal)   Resp 16        Physical Exam  Cardiovascular:      Rate and Rhythm: Normal rate and regular rhythm. Pulses: Normal pulses. Heart sounds: Normal heart sounds. No murmur. Pulmonary:      Effort: Pulmonary effort is normal. No respiratory distress. Breath sounds: Normal breath sounds. No stridor. No wheezing, rhonchi or rales. Abdominal:      General: Bowel sounds are normal. There is no distension. Palpations: There is no mass. Tenderness: There is no abdominal tenderness. There is no right CVA tenderness, left CVA tenderness or rebound. Hernia: No hernia is present. Musculoskeletal:      Right lower leg: No edema. Left lower leg: No edema. Skin:     General: Skin is warm. Capillary Refill: Capillary refill takes less than 2 seconds. Findings: No rash. Comments: Procedure note    Pre procedure diagnoses: Uncertain neoplasm of skin of thigh  Post procedure diagnosis: Same    Procedure: Punch biopsy      After informed written consent was obtained, using Betadine for cleansing and 1% Lidocaine with epinephrine for anesthetic 1.5 ml infiltrated around the skin lesion), with sterile technique a 6 mm punch biopsy was used to obtain a biopsy specimen of the lesion. Hemostasis was obtained by pressure and wound was not sutured but rather silver nitrate was used for hemostasis. Antibiotic dressing is applied, and wound care instructions provided. Be alert for any signs of cutaneous infection. The specimen is labeled and sent to pathology for evaluation. The procedure was well tolerated without complications. Neurological:      Mental Status: He is oriented to person, place, and time. Assessment:      Diagnosis Orders   1. Neoplasm of uncertain behavior of skin of thigh  mupirocin (BACTROBAN) 2 % ointment    38027 - [place] PUNCH BIOPSY SKIN SINGLE LESION   2. Iron deficiency anemia, unspecified iron deficiency anemia type  CBC    Iron and TIBC    Ferritin   3. Cough     4.  Essential hypertension         Plan:     Proceed with punch biopsy of left thigh lesion  Wound care discussed -keep area clean with warm soapy water  Use Bactroban to wound for several weeks until scab falls off  Obtain repeat labs in 6 weeks to evaluate CBC and iron counts  Obtain Cologuard  Consider GI work-up and hematology work-up depending on repeat lab results  Discontinue valsartan

## 2020-11-11 LAB — DERMATOLOGY PATHOLOGY REPORT: NORMAL

## 2020-11-12 ASSESSMENT — ENCOUNTER SYMPTOMS
COLOR CHANGE: 1
STRIDOR: 0
WHEEZING: 0
COUGH: 1
SHORTNESS OF BREATH: 0

## 2020-12-17 LAB
ABSOLUTE BASO #: 0 X10E9/L (ref 0–0.2)
ABSOLUTE EOS #: 0.2 X10E9/L (ref 0–0.4)
ABSOLUTE LYMPH #: 2.1 X10E9/L (ref 1–3.5)
ABSOLUTE MONO #: 0.5 X10E9/L (ref 0–0.9)
ABSOLUTE NEUT #: 3 X10E9/L (ref 1.5–6.6)
BASOPHILS RELATIVE PERCENT: 0.8 %
EOSINOPHILS RELATIVE PERCENT: 2.8 %
FERRITIN: 151 NG/ML (ref 24–336)
HCT VFR BLD CALC: 39.3 % (ref 39–49)
HEMOGLOBIN: 13.5 G/DL (ref 13–17)
IRON SATURATION: 18 % SATURATION (ref 20–50)
IRON, SERUM: 71 UG/DL (ref 50–212)
LYMPHOCYTE %: 36.1 %
MCH RBC QN AUTO: 31.8 PG (ref 27–34)
MCHC RBC AUTO-ENTMCNC: 34.3 G/DL (ref 32–36)
MCV RBC AUTO: 93 FL (ref 80–100)
MONOCYTES # BLD: 8.5 %
NEUTROPHILS RELATIVE PERCENT: 51.8 %
PDW BLD-RTO: 14.1 % (ref 11.5–15)
PLATELETS: 224 X10E9/L (ref 150–450)
PMV BLD AUTO: 9.2 FL (ref 7–12)
RBC: 4.24 X10E12/L (ref 4.1–5.7)
TOTAL IRON BINDING CAPACITY: 402 UG/DL (ref 250–425)
WBC: 5.8 X10E9/L (ref 4–11)

## 2021-03-26 DIAGNOSIS — E78.5 HYPERLIPIDEMIA, UNSPECIFIED HYPERLIPIDEMIA TYPE: ICD-10-CM

## 2021-03-29 RX ORDER — ATORVASTATIN CALCIUM 20 MG/1
20 TABLET, FILM COATED ORAL DAILY
Qty: 90 TABLET | Refills: 1 | Status: SHIPPED | OUTPATIENT
Start: 2021-03-29 | End: 2022-04-11

## 2021-03-29 NOTE — TELEPHONE ENCOUNTER
LOV 11-9-20  LRF 9-28-20    Health Maintenance   Topic Date Due    Hepatitis C screen  Never done    COVID-19 Vaccine (1) Never done    Shingles Vaccine (1 of 2) Never done    Lipid screen  11/06/2021    Potassium monitoring  11/06/2021    Creatinine monitoring  11/06/2021    Colon cancer screen fecal DNA test (Cologuard)  11/30/2023    DTaP/Tdap/Td vaccine (2 - Td) 08/02/2027    Flu vaccine  Completed    HIV screen  Completed    Hepatitis A vaccine  Aged Out    Hepatitis B vaccine  Aged Out    Hib vaccine  Aged Out    Meningococcal (ACWY) vaccine  Aged Out    Pneumococcal 0-64 years Vaccine  Aged Out             (applicable per patient's age: Cancer Screenings, Depression Screening, Fall Risk Screening, Immunizations)    LDL Calculated (mg/dL)   Date Value   11/06/2020 83     AST (U/L)   Date Value   11/06/2020 19     ALT (U/L)   Date Value   11/06/2020 23     BUN (mg/dL)   Date Value   11/06/2020 15      (goal A1C is < 7)   (goal LDL is <100) need 30-50% reduction from baseline     BP Readings from Last 3 Encounters:   11/09/20 122/66   10/19/20 112/82   08/27/20 124/84    (goal /80)      All Future Testing planned in [facility]:  Lab Frequency Next Occurrence   CBC Once 12/21/2020   Iron and TIBC Once 12/21/2020   Ferritin Once 12/21/2020       Next Visit Date:  Future Appointments   Date Time Provider Maria M Miller   10/20/2021  3:00 PM Amish Sanchez MD [facility] [address]            Patient Active Problem List:     Hyperlipidemia     Hypertension     Anxiety     Obsessive behaviors     Seasonal allergies

## 2021-09-07 DIAGNOSIS — I10 ESSENTIAL HYPERTENSION: ICD-10-CM

## 2021-09-08 RX ORDER — VALSARTAN AND HYDROCHLOROTHIAZIDE 160; 12.5 MG/1; MG/1
1 TABLET, FILM COATED ORAL DAILY
Qty: 90 TABLET | Refills: 3 | Status: SHIPPED | OUTPATIENT
Start: 2021-09-08 | End: 2022-08-11

## 2021-09-08 NOTE — TELEPHONE ENCOUNTER
LOV 11-9-20  LRF 11-17-20    Health Maintenance   Topic Date Due    Hepatitis C screen  Never done    COVID-19 Vaccine (1) Never done    Shingles Vaccine (1 of 2) Never done    Flu vaccine (1) 09/01/2021    Lipid screen  11/06/2021    Potassium monitoring  11/06/2021    Creatinine monitoring  11/06/2021    Colon cancer screen fecal DNA test (Cologuard)  11/30/2023    DTaP/Tdap/Td vaccine (2 - Td or Tdap) 08/02/2027    HIV screen  Completed    Hepatitis A vaccine  Aged Out    Hepatitis B vaccine  Aged Out    Hib vaccine  Aged Out    Meningococcal (ACWY) vaccine  Aged Out    Pneumococcal 0-64 years Vaccine  Aged Out             (applicable per patient's age: Cancer Screenings, Depression Screening, Fall Risk Screening, Immunizations)    LDL Calculated (mg/dL)   Date Value   11/06/2020 83     AST (U/L)   Date Value   11/06/2020 19     ALT (U/L)   Date Value   11/06/2020 23     BUN (mg/dL)   Date Value   11/06/2020 15      (goal A1C is < 7)   (goal LDL is <100) need 30-50% reduction from baseline     BP Readings from Last 3 Encounters:   11/09/20 122/66   10/19/20 112/82   08/27/20 124/84    (goal /80)      All Future Testing planned in [facility]:  Lab Frequency Next Occurrence       Next Visit Date:  Future Appointments   Date Time Provider Maria M Miller   10/20/2021  3:00 PM MD Radha Holland            Patient Active Problem List:     Hyperlipidemia     Hypertension     Anxiety     Obsessive behaviors     Seasonal allergies

## 2021-10-20 ENCOUNTER — OFFICE VISIT (OUTPATIENT)
Dept: FAMILY MEDICINE CLINIC | Age: 51
End: 2021-10-20
Payer: COMMERCIAL

## 2021-10-20 VITALS
HEART RATE: 78 BPM | SYSTOLIC BLOOD PRESSURE: 128 MMHG | BODY MASS INDEX: 30.94 KG/M2 | RESPIRATION RATE: 16 BRPM | WEIGHT: 221 LBS | HEIGHT: 71 IN | DIASTOLIC BLOOD PRESSURE: 82 MMHG | TEMPERATURE: 98.1 F

## 2021-10-20 DIAGNOSIS — J30.1 SEASONAL ALLERGIC RHINITIS DUE TO POLLEN: ICD-10-CM

## 2021-10-20 DIAGNOSIS — E78.5 HYPERLIPIDEMIA, UNSPECIFIED HYPERLIPIDEMIA TYPE: ICD-10-CM

## 2021-10-20 DIAGNOSIS — F41.9 ANXIETY: ICD-10-CM

## 2021-10-20 DIAGNOSIS — E66.09 CLASS 1 OBESITY DUE TO EXCESS CALORIES WITH SERIOUS COMORBIDITY AND BODY MASS INDEX (BMI) OF 30.0 TO 30.9 IN ADULT: ICD-10-CM

## 2021-10-20 DIAGNOSIS — M54.16 LUMBAR BACK PAIN WITH RADICULOPATHY AFFECTING RIGHT LOWER EXTREMITY: ICD-10-CM

## 2021-10-20 DIAGNOSIS — Z87.448 HISTORY OF HEMATURIA: ICD-10-CM

## 2021-10-20 DIAGNOSIS — Z12.5 SCREENING PSA (PROSTATE SPECIFIC ANTIGEN): ICD-10-CM

## 2021-10-20 DIAGNOSIS — I49.9 IRREGULAR HEART RATE: ICD-10-CM

## 2021-10-20 DIAGNOSIS — Z86.2 HISTORY OF ANEMIA: ICD-10-CM

## 2021-10-20 DIAGNOSIS — I10 ESSENTIAL HYPERTENSION: ICD-10-CM

## 2021-10-20 DIAGNOSIS — Z00.00 ANNUAL PHYSICAL EXAM: Primary | ICD-10-CM

## 2021-10-20 DIAGNOSIS — Z86.018 HISTORY OF DYSPLASTIC NEVUS: ICD-10-CM

## 2021-10-20 DIAGNOSIS — R05.9 COUGH: ICD-10-CM

## 2021-10-20 DIAGNOSIS — Z11.59 NEED FOR HEPATITIS C SCREENING TEST: ICD-10-CM

## 2021-10-20 PROCEDURE — 93000 ELECTROCARDIOGRAM COMPLETE: CPT | Performed by: PEDIATRICS

## 2021-10-20 PROCEDURE — 99396 PREV VISIT EST AGE 40-64: CPT | Performed by: PEDIATRICS

## 2021-10-20 RX ORDER — ALBUTEROL SULFATE 90 UG/1
2 AEROSOL, METERED RESPIRATORY (INHALATION) 4 TIMES DAILY PRN
Qty: 54 G | Refills: 1 | Status: SHIPPED | OUTPATIENT
Start: 2021-10-20

## 2021-10-20 SDOH — ECONOMIC STABILITY: FOOD INSECURITY: WITHIN THE PAST 12 MONTHS, YOU WORRIED THAT YOUR FOOD WOULD RUN OUT BEFORE YOU GOT MONEY TO BUY MORE.: NEVER TRUE

## 2021-10-20 SDOH — ECONOMIC STABILITY: FOOD INSECURITY: WITHIN THE PAST 12 MONTHS, THE FOOD YOU BOUGHT JUST DIDN'T LAST AND YOU DIDN'T HAVE MONEY TO GET MORE.: NEVER TRUE

## 2021-10-20 ASSESSMENT — ENCOUNTER SYMPTOMS
EYE PAIN: 0
COUGH: 1
EYE DISCHARGE: 0
VOICE CHANGE: 0
CHOKING: 0
CONSTIPATION: 0
CHEST TIGHTNESS: 0
WHEEZING: 0
RHINORRHEA: 0
NAUSEA: 0
SHORTNESS OF BREATH: 0
DIARRHEA: 1
COLOR CHANGE: 0
BACK PAIN: 1
VOMITING: 0
BLOOD IN STOOL: 0
ABDOMINAL PAIN: 0
TROUBLE SWALLOWING: 0
EYE ITCHING: 0
STRIDOR: 0
SINUS PRESSURE: 0

## 2021-10-20 ASSESSMENT — PATIENT HEALTH QUESTIONNAIRE - PHQ9
2. FEELING DOWN, DEPRESSED OR HOPELESS: 0
1. LITTLE INTEREST OR PLEASURE IN DOING THINGS: 0
SUM OF ALL RESPONSES TO PHQ QUESTIONS 1-9: 0
SUM OF ALL RESPONSES TO PHQ9 QUESTIONS 1 & 2: 0
SUM OF ALL RESPONSES TO PHQ QUESTIONS 1-9: 0
SUM OF ALL RESPONSES TO PHQ QUESTIONS 1-9: 0

## 2021-10-20 ASSESSMENT — SOCIAL DETERMINANTS OF HEALTH (SDOH): HOW HARD IS IT FOR YOU TO PAY FOR THE VERY BASICS LIKE FOOD, HOUSING, MEDICAL CARE, AND HEATING?: NOT HARD AT ALL

## 2021-10-20 NOTE — PROGRESS NOTES
10/20/2021    Macy Saunders (:  1970) is a 46 y.o. male, here for a preventive medicine evaluation. Patient presents today for routine annual physical. He does have a history of hypertension, hyperlipidemia, obesity, seasonal allergies, chronic cough and anxiety with some obsessive behaviors in the past. He also has a history of intermittent anemia and history of microhematuria. His hypertension is treated with Diovan/HCTZ and he is tolerating this well. His blood pressures are well controlled. He does have hyperlipidemia that is treated with a atorvastatin and he usually takes this about four times weekly. He is tolerating this without side effects. He does have a history of seasonal allergies that were previously well controlled with allergy shots. He does still have some congestion, postnasal drip and a chronic cough that has been present since his physical in . He has tried over-the-counter antihistamine in addition to [facility] which has not been super helpful. He also thinks that certain exposures where he works at [employer] may be contributing to his symptoms. He has not seen his allergist and I strongly encouraged him to return to allergy/immunology for further testing including chest x-ray and spirometry. May require allergy shots again. In the meantime I did tell him to try some Allegra/Nasonex in place of the Claritin/Flonase. He denies any acid reflux symptoms. He did have a normal chest x-ray in 2020. He does have a history of anxiety and takes Celexa for this. This does control his symptoms well. He previously took lorazepam in the past but he hasn't taken this for many years and is doing well without it. He does have a history of a very mild intermittent anemia for the last several years with no signs of active bleeding. His last blood counts were normal in 2020 after having a slight anemia and low iron count in 2020.  Interestingly his mother has some type of anemia for which she requires intermittent transfusions. He does have a history of microhematuria in the past but this has been negative in the last several years. He did have an episode of acute low back pain with radiation down the right leg in 2020. He was referred to neurosurgery and physical therapy. Physical therapy actually relieved his symptoms and neurosurgery cleared him. He does get occasional right-sided lower back pain that will sometimes radiate down the right leg especially if he is sitting in the car. Otherwise this has not been bothering him much. He did have a dysplastic nevus on the left thigh that was removed in 2020. He has not seen a dermatologist. He has no new skin lesions. His heart rate was between 50 and 60 today and was slightly irregular. I suspect that he is having some PVCs or skipped beats. He will occasionally hear his heartbeat when he is laying in bed at night and hears skipped beats occasionally but otherwise denies any chest pain, shortness of breath, palpitations or diaphoresis. I did advise him that skipped beats or extra beats are often very common and he may be hearing this. We will get a twelve-lead EKG today to ensure there are no changes from before. In the absence of any other symptoms I did reassure him that likely this is nothing concerning. He did have a Cologuard in November 2020 that was negative. This will be due again in 2023. He did complete Covid vaccinations just yesterday. This flu shot is up-to-date. He is due for shingles vaccine. He has been taking a baby aspirin, fish oil, B12 and heart cherry juice daily. He has no other concerns at this time. cmw          Patient Active Problem List   Diagnosis    Hyperlipidemia    Hypertension    Anxiety    Obsessive behaviors    Seasonal allergies       Review of Systems   Constitutional: Negative for appetite change, diaphoresis, fatigue, fever and unexpected weight change. He has gained some weight since his last visit   HENT: Positive for postnasal drip. Negative for congestion, ear discharge, ear pain, hearing loss, rhinorrhea, sinus pressure, sneezing, trouble swallowing and voice change. Seasonal allergies not well controlled with claritin and flonase   Eyes: Negative for pain, discharge, itching and visual disturbance. Respiratory: Positive for cough. Negative for choking, chest tightness, shortness of breath, wheezing and stridor. Chronic cough   Cardiovascular: Negative for chest pain, palpitations and leg swelling. Gastrointestinal: Positive for diarrhea (intermittently). Negative for abdominal pain, blood in stool, constipation, nausea and vomiting. Endocrine: Negative for polydipsia and polyphagia. Genitourinary: Negative for decreased urine volume, difficulty urinating, dysuria, frequency, hematuria and urgency. History of microhematuria. Musculoskeletal: Positive for back pain (occasional). Negative for arthralgias, gait problem, joint swelling, myalgias and neck pain. Skin: Negative for color change, pallor, rash and wound. History of dysplastic nevi of the left thigh   Allergic/Immunologic: Negative for immunocompromised state. Neurological: Negative for dizziness, tremors, syncope, speech difficulty, weakness, light-headedness, numbness and headaches. Hematological: Negative for adenopathy. Does not bruise/bleed easily. History of very mild anemia   Psychiatric/Behavioral: Negative for decreased concentration, dysphoric mood, self-injury and sleep disturbance. The patient is nervous/anxious (well controlled with celexa). Prior to Visit Medications    Medication Sig Taking?  Authorizing Provider   albuterol sulfate HFA (VENTOLIN HFA) 108 (90 Base) MCG/ACT inhaler Inhale 2 puffs into the lungs 4 times daily as needed for Wheezing Yes Vandana Bah MD   valsartan-hydroCHLOROthiazide (DIOVAN-HCT) 160-12.5 MG per tablet Take 1 tablet by mouth daily Yes Virgie Ocampo MD   atorvastatin (LIPITOR) 20 MG tablet Take 1 tablet by mouth daily Yes Virgie Ocampo MD   citalopram (CELEXA) 20 MG tablet Take 1 tablet by mouth daily Yes Virgie Ocampo MD   vitamin B-12 (CYANOCOBALAMIN) 500 MCG tablet Take 500 mcg by mouth daily. Yes Historical Provider, MD   aspirin 81 MG tablet Take 81 mg by mouth daily. Yes Historical Provider, MD   Omega-3 Fatty Acids (FISH OIL) 1000 MG CPDR Take  by mouth. Yes Historical Provider, MD        No Known Allergies    Past Medical History:   Diagnosis Date    Anxiety     Benign neoplasm of soft tissue of leg     Chest pain, atypical     Hemorrhoids     Hyperlipidemia     Hypertension     Palpitations     Sebaceous cyst        History reviewed. No pertinent surgical history. Social History     Socioeconomic History    Marital status:      Spouse name: Not on file    Number of children: Not on file    Years of education: Not on file    Highest education level: Not on file   Occupational History    Not on file   Tobacco Use    Smoking status: Never Smoker    Smokeless tobacco: Never Used   Substance and Sexual Activity    Alcohol use: Not on file    Drug use: Not on file    Sexual activity: Not on file   Other Topics Concern    Not on file   Social History Narrative    Not on file     Social Determinants of Health     Financial Resource Strain: Low Risk     Difficulty of Paying Living Expenses: Not hard at all   Food Insecurity: No Food Insecurity    Worried About Running Out of Food in the Last Year: Never true    [address] in the Last Year: Never true   Transportation Needs:     Lack of Transportation (Medical):      Lack of Transportation (Non-Medical):    Physical Activity:     Days of Exercise per Week:     Minutes of Exercise per Session:    Stress:     Feeling of Stress :    Social Connections:     Frequency of Communication with Friends and Family:     Frequency of Social Gatherings with Friends and Family:     Attends Islam Services:     Active Member of Clubs or Organizations:     Attends Club or Organization Meetings:     Marital Status:    Intimate Partner Violence:     Fear of Current or Ex-Partner:     Emotionally Abused:     Physically Abused:     Sexually Abused:         History reviewed. No pertinent family history. ADVANCE DIRECTIVE: N, <no information>    Vitals:    10/20/21 1437   BP: 128/82   Site: Right Upper Arm   Position: Sitting   Cuff Size: Medium Adult   Pulse: 78   Resp: 16   Temp: 98.1 °F (36.7 °C)   TempSrc: Temporal   Weight: 221 lb (100.2 kg)   Height: 5' 11\" (1.803 m)     Estimated body mass index is 30.82 kg/m² as calculated from the following:    Height as of this encounter: 5' 11\" (1.803 m). Weight as of this encounter: 221 lb (100.2 kg). Physical Exam  Vitals and nursing note reviewed. Constitutional:       General: He is not in acute distress. Appearance: Normal appearance. He is well-developed. He is not ill-appearing, toxic-appearing or diaphoretic. HENT:      Head: Normocephalic. Right Ear: Tympanic membrane, ear canal and external ear normal. There is no impacted cerumen. Left Ear: Tympanic membrane, ear canal and external ear normal.      Nose: Nose normal.      Mouth/Throat:      Mouth: Mucous membranes are moist.      Pharynx: No oropharyngeal exudate. Eyes:      General: No scleral icterus. Right eye: No discharge. Left eye: No discharge. Extraocular Movements: Extraocular movements intact. Conjunctiva/sclera: Conjunctivae normal.      Pupils: Pupils are equal, round, and reactive to light. Neck:      Thyroid: No thyromegaly. Vascular: No carotid bruit or JVD. Cardiovascular:      Rate and Rhythm: Normal rate. Rhythm regularly irregular. Pulses: Normal pulses. Heart sounds: Normal heart sounds.  No murmur heard. Pulmonary:      Effort: Pulmonary effort is normal. No respiratory distress. Breath sounds: Normal breath sounds. No stridor. No wheezing or rales. Chest:      Chest wall: No tenderness. Abdominal:      General: Bowel sounds are normal. There is no distension. Palpations: Abdomen is soft. There is no mass. Tenderness: There is no abdominal tenderness. There is no right CVA tenderness or left CVA tenderness. Musculoskeletal:         General: No tenderness. Normal range of motion. Cervical back: Normal range of motion and neck supple. No tenderness. Right knee: Normal.      Left knee: Normal.      Right lower leg: No edema. Left lower leg: No edema. Lymphadenopathy:      Cervical: No cervical adenopathy. Skin:     General: Skin is warm. Capillary Refill: Capillary refill takes less than 2 seconds. Coloration: Skin is not pale. Findings: No erythema or rash. Neurological:      General: No focal deficit present. Mental Status: He is alert and oriented to person, place, and time. Cranial Nerves: No cranial nerve deficit. Motor: No abnormal muscle tone. Coordination: Coordination normal.      Deep Tendon Reflexes: Reflexes are normal and symmetric. Psychiatric:         Mood and Affect: Mood normal. Mood is not depressed. Speech: Speech normal.         Behavior: Behavior normal.         Thought Content: Thought content normal.         Judgment: Judgment normal.         No flowsheet data found.     Lab Results   Component Value Date    CHOL 140 11/06/2020    CHOL 192 10/17/2019    CHOL 226 10/18/2018    CHOL 224 08/04/2017    CHOL 196 07/16/2015    CHOL 194 06/27/2014    TRIG 108 11/06/2020    TRIG 155 10/17/2019    TRIG 203 10/18/2018    HDL 35 11/06/2020    HDL 42 10/17/2019    HDL 43.0 10/18/2018    LDLCALC 83 11/06/2020    LDLCALC 119 10/17/2019    LDLCALC 142 10/18/2018    GLUCOSE 78 11/06/2020       The 10-year ASCVD risk score (Jorge L Erwin, et al., 2013) is: 4.9%    Values used to calculate the score:      Age: 46 years      Sex: Male      Is Non- : No      Diabetic: No      Tobacco smoker: No      Systolic Blood Pressure: 144 mmHg      Is BP treated: Yes      HDL Cholesterol: 35 mg/dL      Total Cholesterol: 140 mg/dL    Immunization History   Administered Date(s) Administered    COVID-19, Pfizer, PF, 30mcg/0.3mL 09/29/2021, 10/19/2021    Influenza Virus Vaccine 10/14/2019, 10/13/2020    Influenza, Quadv, IM, PF (6 mo and older Fluzone, Flulaval, Fluarix, and 3 yrs and older Afluria) 09/23/2021    Tdap (Boostrix, Adacel) 08/02/2017       Health Maintenance   Topic Date Due    Hepatitis C screen  Never done    Shingles Vaccine (1 of 2) Never done    Lipid screen  11/06/2021    Potassium monitoring  11/06/2021    Creatinine monitoring  11/06/2021    COVID-19 Vaccine (3 - Pfizer booster) 04/19/2022    Colon cancer screen fecal DNA test (Cologuard)  11/30/2023    DTaP/Tdap/Td vaccine (2 - Td or Tdap) 08/02/2027    Flu vaccine  Completed    HIV screen  Completed    Hepatitis A vaccine  Aged Out    Hepatitis B vaccine  Aged Out    Hib vaccine  Aged Out    Meningococcal (ACWY) vaccine  Aged Out    Pneumococcal 0-64 years Vaccine  Aged Out          ASSESSMENT/PLAN:    1. Annual physical exam  2. Essential hypertension  -     Comprehensive Metabolic Panel; Future  -     CBC; Future  3. Hyperlipidemia, unspecified hyperlipidemia type  -     Lipid Panel; Future  4. Class 1 obesity due to excess calories with serious comorbidity and body mass index (BMI) of 30.0 to 30.9 in adult  5. Seasonal allergic rhinitis due to pollen  6. Cough  -     albuterol sulfate HFA (VENTOLIN HFA) 108 (90 Base) MCG/ACT inhaler; Inhale 2 puffs into the lungs 4 times daily as needed for Wheezing, Disp-54 g, R-1Normal  7. Anxiety  8. History of anemia  -     Iron and TIBC; Future  -     Ferritin; Future  9.  History of hematuria  -     Urinalysis Reflex to Culture; Future  10. Lumbar back pain with radiculopathy affecting right lower extremity  11. History of dysplastic nevus  12. Irregular heart rate  -     EKG 12 lead; Future  13. Screening PSA (prostate specific antigen)  -     PSA screening; Future  14. Need for hepatitis C screening test  -     Hepatitis C Antibody; Future      Continue current medications  Obtain labs as ordered  Daily exercise and healthy diet encouraged  Weight reduction encouraged  Return to allergist for evaluation  Recommend chest x-ray and spirometry/PFTs -patient will follow-up with allergist regarding these tests  Continue Celexa  Obtain labs as ordered for reevaluation of anemia  Obtain urinalysis  Monitor lumbar back pain for worsening and consider physical therapy if symptoms worsen  Yearly skin check with dermatology recommended  Obtain twelve-lead EKG -normal sinus rhythm without significant abnormality when compared to 2012 and 2014 EKGs  Shingles vaccine recommended  Call with concerns        Return in about 1 year (around 10/20/2022) for annual physical.       An electronic signature was used to authenticate this note.     --Jorge Brown MD on 10/20/2021 at 4:30 PM

## 2021-10-22 DIAGNOSIS — F41.9 ANXIETY: ICD-10-CM

## 2021-10-22 LAB
ABSOLUTE BASO #: 0 X10E9/L (ref 0–0.2)
ABSOLUTE EOS #: 0.1 X10E9/L (ref 0–0.4)
ABSOLUTE LYMPH #: 1.5 X10E9/L (ref 1–3.5)
ABSOLUTE MONO #: 0.5 X10E9/L (ref 0–0.9)
ABSOLUTE NEUT #: 1.6 X10E9/L (ref 1.5–6.6)
ALBUMIN SERPL-MCNC: 4.5 G/DL (ref 3.2–5.3)
ALK PHOSPHATASE: 75 U/L (ref 39–130)
ALT SERPL-CCNC: 24 U/L (ref 0–40)
ANION GAP SERPL CALCULATED.3IONS-SCNC: 9 MMOL/L (ref 5–15)
APPEARANCE: CLEAR
AST SERPL-CCNC: 18 U/L (ref 0–41)
BASOPHILS RELATIVE PERCENT: 1.2 %
BILIRUB SERPL-MCNC: 0.7 MG/DL (ref 0.3–1.2)
BILIRUBIN: NEGATIVE
BUN BLDV-MCNC: 16 MG/DL (ref 5–23)
CALCIUM SERPL-MCNC: 9.4 MG/DL (ref 8.5–10.5)
CHLORIDE BLD-SCNC: 101 MMOL/L (ref 98–109)
CHOLESTEROL/HDL RATIO: 4.7 (ref 1–5)
CHOLESTEROL: 214 MG/DL (ref 150–200)
CO2: 29 MMOL/L (ref 22–32)
COLOR: YELLOW
CREAT SERPL-MCNC: 0.8 MG/DL (ref 0.6–1.3)
EGFR AFRICAN AMERICAN: >60 ML/MIN/1.73SQ.M
EGFR IF NONAFRICAN AMERICAN: >60 ML/MIN/1.73SQ.M
EOSINOPHILS RELATIVE PERCENT: 2.8 %
FERRITIN: 203 NG/ML (ref 24–336)
GLUCOSE BLD-MCNC: NEGATIVE MG/DL
GLUCOSE: 92 MG/DL (ref 65–99)
HCT VFR BLD CALC: 40.6 % (ref 39–49)
HDLC SERPL-MCNC: 46 MG/DL
HEMOGLOBIN: 13.7 G/DL (ref 13–17)
HEPATITIS C ANTIBODY: NORMAL
IRON SATURATION: 17 % (ref 20–50)
IRON, SERUM: 59 UG/DL (ref 59–158)
KETONES, URINE: NEGATIVE MG/DL
LDL CHOLESTEROL CALCULATED: 124 MG/DL
LDL/HDL RATIO: 2.7
LEUKOCYTE ESTERASE, URINE: NEGATIVE
LYMPHOCYTE %: 38.9 %
MCH RBC QN AUTO: 31.4 PG (ref 27–34)
MCHC RBC AUTO-ENTMCNC: 33.8 G/DL (ref 32–36)
MCV RBC AUTO: 93 FL (ref 80–100)
MONOCYTES # BLD: 14 %
NEUTROPHILS RELATIVE PERCENT: 43.1 %
NITRITE, URINE: NEGATIVE
OCCULT BLOOD,URINE: ABNORMAL
OTHER MICROSCOPIC ELEMENTS: ABNORMAL
PDW BLD-RTO: 13.4 % (ref 11.5–15)
PH: 6 (ref 5–8.5)
PLATELETS: 188 X10E9/L (ref 150–450)
PMV BLD AUTO: 9.2 FL (ref 7–12)
POTASSIUM SERPL-SCNC: 4 MMOL/L (ref 3.5–5)
PROTEIN, URINE: ABNORMAL MG/DL
PSA, ULTRASENSITIVE: 0.39 NG/ML (ref 0–4)
RBC: 4.37 X10E12/L (ref 4.1–5.7)
RBC: 6 /HPF (ref 0–5)
SITE/TYPE: ABNORMAL
SODIUM BLD-SCNC: 139 MMOL/L (ref 134–146)
SP GRAVITY MISCELLANEOUS: 1.03 (ref 1–1.03)
TOTAL IRON BINDING CAPACITY: 357 UG/DL (ref 250–450)
TOTAL PROTEIN: 7.2 G/DL (ref 6–8)
TRIGL SERPL-MCNC: 220 MG/DL (ref 27–150)
UNSATURATED IRON BINDING CAPACITY: 298 UG/DL (ref 112–347)
UROBILINOGEN, URINE: <1.1 EU/DL
VLDLC SERPL CALC-MCNC: 44 MG/DL (ref 0–30)
WBC: 1 /HPF (ref 0–5)
WBC: 3.8 X10E9/L (ref 4–11)

## 2021-10-25 RX ORDER — CITALOPRAM 20 MG/1
20 TABLET ORAL DAILY
Qty: 90 TABLET | Refills: 3 | Status: SHIPPED | OUTPATIENT
Start: 2021-10-25 | End: 2022-08-30

## 2021-10-25 NOTE — TELEPHONE ENCOUNTER
LOV 10-20-21  LRF 11-8-20    Health Maintenance   Topic Date Due    Shingles Vaccine (1 of 2) Never done    COVID-19 Vaccine (3 - Pfizer booster) 04/19/2022    Lipid screen  10/21/2022    Potassium monitoring  10/21/2022    Creatinine monitoring  10/21/2022    Colon cancer screen fecal DNA test (Cologuard)  11/30/2023    DTaP/Tdap/Td vaccine (2 - Td or Tdap) 08/02/2027    Flu vaccine  Completed    Hepatitis C screen  Completed    HIV screen  Completed    Hepatitis A vaccine  Aged Out    Hepatitis B vaccine  Aged Out    Hib vaccine  Aged Out    Meningococcal (ACWY) vaccine  Aged Out    Pneumococcal 0-64 years Vaccine  Aged Out             (applicable per patient's age: Cancer Screenings, Depression Screening, Fall Risk Screening, Immunizations)    LDL Calculated (mg/dL)   Date Value   10/21/2021 124     AST (U/L)   Date Value   10/21/2021 18     ALT (U/L)   Date Value   10/21/2021 24     BUN (mg/dL)   Date Value   10/21/2021 16      (goal A1C is < 7)   (goal LDL is <100) need 30-50% reduction from baseline     BP Readings from Last 3 Encounters:   10/20/21 128/82   11/09/20 122/66   10/19/20 112/82    (goal /80)      All Future Testing planned in [facility]:  Lab Frequency Next Occurrence   Lipid Panel Once 10/20/2021   Comprehensive Metabolic Panel Once 12/17/8192   CBC Once 10/20/2021   Iron and TIBC Once 10/20/2021   Ferritin Once 10/20/2021   PSA screening Once 10/20/2021   Hepatitis C Antibody Once 10/20/2021   Urinalysis Reflex to Culture Once 10/20/2021       Next Visit Date:  Future Appointments   Date Time Provider Maria M Miller   10/24/2022  3:30 PM MD Radha Clark            Patient Active Problem List:     Hyperlipidemia     Hypertension     Anxiety     Obsessive behaviors     Seasonal allergies

## 2022-04-09 DIAGNOSIS — E78.5 HYPERLIPIDEMIA, UNSPECIFIED HYPERLIPIDEMIA TYPE: ICD-10-CM

## 2022-04-11 RX ORDER — ATORVASTATIN CALCIUM 20 MG/1
20 TABLET, FILM COATED ORAL DAILY
Qty: 90 TABLET | Refills: 3 | Status: SHIPPED | OUTPATIENT
Start: 2022-04-11 | End: 2023-04-11

## 2022-04-11 NOTE — TELEPHONE ENCOUNTER
Last visit: 10/20/21  Last Med refill: 03/29/21  Does patient have enough medication for 72 hours: Yes    Next Visit Date:  Future Appointments   Date Time Provider Maria M Miller   10/24/2022  2:30 PM MD Skye Keating. [address] Maintenance   Topic Date Due    Shingles Vaccine (1 of 2) Never done    COVID-19 Vaccine (3 - Booster for Pfizer series) 03/19/2022    Depression Screen  10/20/2022    Potassium monitoring  10/21/2022    Creatinine monitoring  10/21/2022    Colorectal Cancer Screen  11/30/2023    Lipid screen  10/21/2026    DTaP/Tdap/Td vaccine (2 - Td or Tdap) 08/02/2027    Flu vaccine  Completed    Hepatitis C screen  Completed    HIV screen  Completed    Hepatitis A vaccine  Aged Out    Hepatitis B vaccine  Aged Out    Hib vaccine  Aged Out    Meningococcal (ACWY) vaccine  Aged Out    Pneumococcal 0-64 years Vaccine  Aged Out       No results found for: LABA1C          ( goal A1C is < 7)   No results found for: LABMICR  LDL Calculated (mg/dL)   Date Value   10/21/2021 124   11/06/2020 83       (goal LDL is <100)   AST (U/L)   Date Value   10/21/2021 18     ALT (U/L)   Date Value   10/21/2021 24     BUN (mg/dL)   Date Value   10/21/2021 16     BP Readings from Last 3 Encounters:   10/20/21 128/82   11/09/20 122/66   10/19/20 112/82          (goal 120/80)    All Future Testing planned in [facility]  Lab Frequency Next Occurrence   Lipid Panel Once 12/03/2021   Comprehensive Metabolic Panel Once 83/24/7594   CBC Once 12/03/2021   Iron and TIBC Once 12/03/2021   Ferritin Once 12/03/2021   PSA screening Once 12/03/2021   Hepatitis C Antibody Once 12/03/2021   Urinalysis Reflex to Culture Once 12/03/2021               Patient Active Problem List:     Hyperlipidemia     Hypertension     Anxiety     Obsessive behaviors     Seasonal allergies

## 2022-08-10 DIAGNOSIS — I10 ESSENTIAL HYPERTENSION: ICD-10-CM

## 2022-08-11 RX ORDER — VALSARTAN AND HYDROCHLOROTHIAZIDE 160; 12.5 MG/1; MG/1
1 TABLET, FILM COATED ORAL DAILY
Qty: 90 TABLET | Refills: 3 | Status: SHIPPED | OUTPATIENT
Start: 2022-08-11 | End: 2023-02-07

## 2022-08-11 NOTE — TELEPHONE ENCOUNTER
LOV 10/20/21  RTO 1 year; F/U scheduled  [facility] 9/8/21    Health Maintenance   Topic Date Due    Shingles vaccine (1 of 2) Never done    COVID-19 Vaccine (3 - Booster for Pfizer series) 03/19/2022    Flu vaccine (1) 09/01/2022    Depression Screen  10/20/2022    Lipids  10/21/2022    Colorectal Cancer Screen  11/30/2023    DTaP/Tdap/Td vaccine (2 - Td or Tdap) 08/02/2027    Hepatitis C screen  Completed    HIV screen  Completed    Hepatitis A vaccine  Aged Out    Hepatitis B vaccine  Aged Out    Hib vaccine  Aged Out    Meningococcal (ACWY) vaccine  Aged Out    Pneumococcal 0-64 years Vaccine  Aged Out             (applicable per patient's age: Cancer Screenings, Depression Screening, Fall Risk Screening, Immunizations)    LDL Calculated (mg/dL)   Date Value   10/21/2021 124     AST (U/L)   Date Value   10/21/2021 18     ALT (U/L)   Date Value   10/21/2021 24     BUN (mg/dL)   Date Value   10/21/2021 16      (goal A1C is < 7)   (goal LDL is <100) need 30-50% reduction from baseline     BP Readings from Last 3 Encounters:   10/20/21 128/82   11/09/20 122/66   10/19/20 112/82    (goal /80)      All Future Testing planned in [facility]:  Lab Frequency Next Occurrence   Lipid Panel Once 12/03/2021   Comprehensive Metabolic Panel Once 59/88/1254   CBC Once 12/03/2021   Iron and TIBC Once 12/03/2021   Ferritin Once 12/03/2021   PSA screening Once 12/03/2021   Hepatitis C Antibody Once 12/03/2021   Urinalysis Reflex to Culture Once 12/03/2021       Next Visit Date:  Future Appointments   Date Time Provider Maria M Miller   10/24/2022  2:30 PM Levander Najjar, MD [facility]            Patient Active Problem List:     Hyperlipidemia     Hypertension     Anxiety     Obsessive behaviors     Seasonal allergies

## 2022-08-26 DIAGNOSIS — F41.9 ANXIETY: ICD-10-CM

## 2022-08-29 NOTE — TELEPHONE ENCOUNTER
Last visit: 10/20/2021  Last Med refill: 10/25/2021  Does patient have enough medication for 72 hours: Yes    Next Visit Date:  Future Appointments   Date Time Provider Maria M Miller   10/24/2022  2:30 PM MD Skye Viveros. Yissel Brush [address] Maintenance   Topic Date Due    Shingles vaccine (1 of 2) Never done    COVID-19 Vaccine (3 - Booster for Pfizer series) 03/19/2022    Flu vaccine (1) 09/01/2022    Depression Screen  10/20/2022    Lipids  10/21/2022    Colorectal Cancer Screen  11/30/2023    DTaP/Tdap/Td vaccine (2 - Td or Tdap) 08/02/2027    Hepatitis C screen  Completed    HIV screen  Completed    Hepatitis A vaccine  Aged Out    Hepatitis B vaccine  Aged Out    Hib vaccine  Aged Out    Meningococcal (ACWY) vaccine  Aged Out    Pneumococcal 0-64 years Vaccine  Aged Out       No results found for: LABA1C          ( goal A1C is < 7)   No results found for: LABMICR  LDL Calculated (mg/dL)   Date Value   10/21/2021 124   11/06/2020 83       (goal LDL is <100)   AST (U/L)   Date Value   10/21/2021 18     ALT (U/L)   Date Value   10/21/2021 24     BUN (mg/dL)   Date Value   10/21/2021 16     BP Readings from Last 3 Encounters:   10/20/21 128/82   11/09/20 122/66   10/19/20 112/82          (goal 120/80)    All Future Testing planned in [facility]  Lab Frequency Next Occurrence   Lipid Panel Once 12/03/2021   Comprehensive Metabolic Panel Once 99/32/0936   CBC Once 12/03/2021   Iron and TIBC Once 12/03/2021   Ferritin Once 12/03/2021   PSA screening Once 12/03/2021   Hepatitis C Antibody Once 12/03/2021   Urinalysis Reflex to Culture Once 12/03/2021               Patient Active Problem List:     Hyperlipidemia     Hypertension     Anxiety     Obsessive behaviors     Seasonal allergies

## 2022-08-30 RX ORDER — CITALOPRAM 20 MG/1
20 TABLET ORAL DAILY
Qty: 90 TABLET | Refills: 3 | Status: SHIPPED | OUTPATIENT
Start: 2022-08-30 | End: 2023-08-30

## 2023-03-10 ENCOUNTER — OFFICE VISIT (OUTPATIENT)
Dept: FAMILY MEDICINE CLINIC | Age: 53
End: 2023-03-10

## 2023-03-10 VITALS
SYSTOLIC BLOOD PRESSURE: 124 MMHG | TEMPERATURE: 97.5 F | DIASTOLIC BLOOD PRESSURE: 82 MMHG | WEIGHT: 224 LBS | BODY MASS INDEX: 31.36 KG/M2 | HEIGHT: 71 IN | RESPIRATION RATE: 16 BRPM | HEART RATE: 72 BPM

## 2023-03-10 DIAGNOSIS — Z83.49 FAMILY HISTORY OF THYROID DISEASE: ICD-10-CM

## 2023-03-10 DIAGNOSIS — R05.3 CHRONIC COUGH: ICD-10-CM

## 2023-03-10 DIAGNOSIS — E78.5 HYPERLIPIDEMIA, UNSPECIFIED HYPERLIPIDEMIA TYPE: ICD-10-CM

## 2023-03-10 DIAGNOSIS — M54.16 LUMBAR BACK PAIN WITH RADICULOPATHY AFFECTING RIGHT LOWER EXTREMITY: ICD-10-CM

## 2023-03-10 DIAGNOSIS — Z86.018 HISTORY OF DYSPLASTIC NEVUS: ICD-10-CM

## 2023-03-10 DIAGNOSIS — F41.9 ANXIETY: ICD-10-CM

## 2023-03-10 DIAGNOSIS — Z86.2 HISTORY OF ANEMIA: ICD-10-CM

## 2023-03-10 DIAGNOSIS — Z87.448 HISTORY OF HEMATURIA: ICD-10-CM

## 2023-03-10 DIAGNOSIS — R20.9 COLD EXTREMITIES: ICD-10-CM

## 2023-03-10 DIAGNOSIS — E66.09 CLASS 1 OBESITY DUE TO EXCESS CALORIES WITH SERIOUS COMORBIDITY AND BODY MASS INDEX (BMI) OF 30.0 TO 30.9 IN ADULT: ICD-10-CM

## 2023-03-10 DIAGNOSIS — J30.1 SEASONAL ALLERGIC RHINITIS DUE TO POLLEN: ICD-10-CM

## 2023-03-10 DIAGNOSIS — I10 ESSENTIAL HYPERTENSION: ICD-10-CM

## 2023-03-10 DIAGNOSIS — Z00.00 ANNUAL PHYSICAL EXAM: Primary | ICD-10-CM

## 2023-03-10 DIAGNOSIS — Z12.5 SCREENING PSA (PROSTATE SPECIFIC ANTIGEN): ICD-10-CM

## 2023-03-10 RX ORDER — ATORVASTATIN CALCIUM 20 MG/1
20 TABLET, FILM COATED ORAL DAILY
Qty: 90 TABLET | Refills: 3 | Status: SHIPPED | OUTPATIENT
Start: 2023-03-10 | End: 2024-03-09

## 2023-03-10 RX ORDER — VALSARTAN AND HYDROCHLOROTHIAZIDE 160; 12.5 MG/1; MG/1
1.5 TABLET, FILM COATED ORAL DAILY
Qty: 135 TABLET | Refills: 3 | Status: SHIPPED | OUTPATIENT
Start: 2023-03-10 | End: 2023-09-06

## 2023-03-10 ASSESSMENT — ENCOUNTER SYMPTOMS
EYE DISCHARGE: 0
WHEEZING: 0
CHEST TIGHTNESS: 0
COUGH: 1
COLOR CHANGE: 0
VOICE CHANGE: 0
RHINORRHEA: 0
EYE ITCHING: 0
CHOKING: 0
DIARRHEA: 0
BACK PAIN: 1
BLOOD IN STOOL: 0
VOMITING: 0
NAUSEA: 0
SINUS PRESSURE: 0
SHORTNESS OF BREATH: 0
TROUBLE SWALLOWING: 0
STRIDOR: 0
EYE PAIN: 0
CONSTIPATION: 0
ABDOMINAL PAIN: 0

## 2023-03-10 ASSESSMENT — PATIENT HEALTH QUESTIONNAIRE - PHQ9
2. FEELING DOWN, DEPRESSED OR HOPELESS: 0
SUM OF ALL RESPONSES TO PHQ QUESTIONS 1-9: 0
1. LITTLE INTEREST OR PLEASURE IN DOING THINGS: 0
SUM OF ALL RESPONSES TO PHQ QUESTIONS 1-9: 0
SUM OF ALL RESPONSES TO PHQ QUESTIONS 1-9: 0
SUM OF ALL RESPONSES TO PHQ9 QUESTIONS 1 & 2: 0
SUM OF ALL RESPONSES TO PHQ QUESTIONS 1-9: 0

## 2023-03-10 NOTE — PROGRESS NOTES
3/10/2023    Mohit Tavares (:  1970) is a 45 y.o. male, here for a preventive medicine evaluation. Patient presents today for routine annual physical.  He does have a history of hypertension, hyperlipidemia, obesity, seasonal allergies, chronic cough and anxiety with some obsessive behaviors in the past.  He also has a history of intermittent anemia and a history of microhematuria. His hypertension is treated with Diovan/HCTZ and he is tolerating this well. He has needed to increase this to 1 1/2 tablets daily because his blood pressures were mildly elevated above 130/80. His blood pressures are now well controlled with this dosage. He does have hyperlipidemia that is treated with atorvastatin and he will take this about 4 times weekly. He is tolerating this without side effects. He does have a history of seasonal allergies previously well controlled with allergy shots. He does still get some congestion, postnasal drip and chronic cough has been present for the last several years. He will use an OTC allergy pill and this helps his symptoms. He was given an albuterol inhaler that did not seem to make a difference in his symptoms. He does think some exposures at work may contribute to his symptoms too. He does have a history of anxiety and takes Celexa for this. This does control his symptoms well. He previously took lorazepam in the past but he has not taken this for many years and he is doing well without it. He does have a history of very mild intermittent anemia for the last several years with no signs of active bleeding. His last blood counts were normal.  Interestingly his mother has some type of anemia for which she requires intermittent transfusions. He does have a history of microhematuria in the past but his last urinalysis have been negative for the last several years. He did have an episode of acute low back pain with radiation down the right leg in .   He was referred to neurosurgery and physical therapy. Physical therapy actually relieved his symptoms and neurosurgery cleared him. He does get occasional right-sided lower back pain that will sometimes radiate down the right leg especially if he is sitting in the car for a long time with his wallet in his right back pocket. This has not been bothering him too much. He did have a dysplastic nevus on the left thigh that was removed in 2020. He has not seen a dermatologist.  He has no new skin lesions. There is one mole on his back that his wife thinks has changed. He does complain of having cold toes. He does have a family history of thyroid disease. He does have an occasional PVC heard on his exam.  He did have a normal EKG last year and this was unchanged from previous EKGs. He denies any cardiac symptoms. He did have a Cologuard in November 2020 that was negative. This is due again at the end of this year. He is taking a B12 complex vitamin, baby ASA and fish oil. He has no other concerns at this time. cmw        Patient Active Problem List   Diagnosis    Hyperlipidemia    Hypertension    Anxiety    Obsessive behaviors    Seasonal allergies       Review of Systems   Constitutional:  Negative for appetite change, diaphoresis, fatigue, fever and unexpected weight change. He has gained some weight since his last visit   HENT:  Positive for postnasal drip. Negative for congestion, ear discharge, ear pain, hearing loss, rhinorrhea, sinus pressure, sneezing, trouble swallowing and voice change. Seasonal allergies not well controlled with claritin and flonase   Eyes:  Negative for pain, discharge, itching and visual disturbance. Cold toes   Respiratory:  Positive for cough (occasional). Negative for choking, chest tightness, shortness of breath, wheezing and stridor. Chronic cough   Cardiovascular:  Negative for chest pain, palpitations and leg swelling.    Gastrointestinal: Negative for abdominal pain, blood in stool, constipation, diarrhea, nausea and vomiting. Endocrine: Positive for cold intolerance. Negative for polydipsia and polyphagia. Genitourinary:  Negative for decreased urine volume, difficulty urinating, dysuria, frequency, hematuria and urgency. History of microhematuria. Musculoskeletal:  Positive for back pain (occasional). Negative for arthralgias, gait problem, joint swelling, myalgias and neck pain. Skin:  Negative for color change, pallor, rash and wound. History of dysplastic nevi of the left thigh / mole on the back that is bigger   Allergic/Immunologic: Negative for immunocompromised state. Neurological:  Negative for dizziness, tremors, syncope, speech difficulty, weakness, light-headedness, numbness and headaches. Hematological:  Negative for adenopathy. Does not bruise/bleed easily. History of very mild anemia   Psychiatric/Behavioral:  Negative for behavioral problems, decreased concentration, dysphoric mood, self-injury and sleep disturbance. The patient is nervous/anxious (well controlled with celexa). Prior to Visit Medications    Medication Sig Taking? Authorizing Provider   valsartan-hydroCHLOROthiazide (DIOVAN-HCT) 160-12.5 MG per tablet Take 1.5 tablets by mouth daily Yes Ludwin James MD   atorvastatin (LIPITOR) 20 MG tablet Take 1 tablet by mouth daily Yes Ludwin James MD   citalopram (CELEXA) 20 MG tablet Take 1 tablet by mouth daily Yes Ludwin James MD   vitamin B-12 (CYANOCOBALAMIN) 500 MCG tablet Take 500 mcg by mouth daily. Yes Historical Provider, MD   aspirin 81 MG tablet Take 81 mg by mouth daily. Yes Historical Provider, MD   Omega-3 Fatty Acids (FISH OIL) 1000 MG CPDR Take  by mouth.  Yes Historical Provider, MD        No Known Allergies    Past Medical History:   Diagnosis Date    Anxiety     Benign neoplasm of soft tissue of leg     Chest pain, atypical Hemorrhoids     Hyperlipidemia     Hypertension     Palpitations     Sebaceous cyst        History reviewed. No pertinent surgical history. Social History     Socioeconomic History    Marital status:      Spouse name: Not on file    Number of children: Not on file    Years of education: Not on file    Highest education level: Not on file   Occupational History    Not on file   Tobacco Use    Smoking status: Never    Smokeless tobacco: Never   Substance and Sexual Activity    Alcohol use: Not on file    Drug use: Not on file    Sexual activity: Not on file   Other Topics Concern    Not on file   Social History Narrative    Not on file     Social Determinants of Health     Financial Resource Strain: Not on file   Food Insecurity: Not on file   Transportation Needs: Not on file   Physical Activity: Not on file   Stress: Not on file   Social Connections: Not on file   Intimate Partner Violence: Not on file   Housing Stability: Not on file        History reviewed. No pertinent family history. ADVANCE DIRECTIVE: N, <no information>    Vitals:    03/10/23 1430   BP: 124/82   Site: Right Upper Arm   Position: Sitting   Cuff Size: Large Adult   Pulse: 72   Resp: 16   Temp: 97.5 °F (36.4 °C)   TempSrc: Temporal   Weight: 224 lb (101.6 kg)   Height: 5' 11\" (1.803 m)     Estimated body mass index is 31.24 kg/m² as calculated from the following:    Height as of this encounter: 5' 11\" (1.803 m). Weight as of this encounter: 224 lb (101.6 kg). Physical Exam  Vitals and nursing note reviewed. Constitutional:       General: He is not in acute distress. Appearance: Normal appearance. He is well-developed. He is not ill-appearing, toxic-appearing or diaphoretic. HENT:      Head: Normocephalic. Right Ear: Tympanic membrane, ear canal and external ear normal. There is no impacted cerumen.       Left Ear: Tympanic membrane, ear canal and external ear normal.      Nose: Nose normal.      Mouth/Throat: Mouth: Mucous membranes are moist.      Pharynx: No oropharyngeal exudate. Eyes:      General: No scleral icterus. Right eye: No discharge. Left eye: No discharge. Extraocular Movements: Extraocular movements intact. Conjunctiva/sclera: Conjunctivae normal.      Pupils: Pupils are equal, round, and reactive to light. Neck:      Thyroid: No thyromegaly. Vascular: No carotid bruit or JVD. Cardiovascular:      Rate and Rhythm: Normal rate. Rhythm regularly irregular. Pulses: Normal pulses. Popliteal pulses are 2+ on the right side and 2+ on the left side. Dorsalis pedis pulses are 2+ on the right side and 2+ on the left side. Heart sounds: Normal heart sounds. No murmur heard. Pulmonary:      Effort: Pulmonary effort is normal. No respiratory distress. Breath sounds: Normal breath sounds. No stridor. No wheezing or rales. Chest:      Chest wall: No tenderness. Abdominal:      General: Bowel sounds are normal. There is no distension. Palpations: Abdomen is soft. There is no mass. Tenderness: There is no abdominal tenderness. There is no right CVA tenderness or left CVA tenderness. Musculoskeletal:         General: No tenderness. Normal range of motion. Cervical back: Normal range of motion and neck supple. No tenderness. Right knee: Normal.      Left knee: Normal.      Right lower leg: No edema. Left lower leg: No edema. Lymphadenopathy:      Cervical: No cervical adenopathy. Skin:     General: Skin is warm. Capillary Refill: Capillary refill takes less than 2 seconds. Coloration: Skin is not pale. Findings: No erythema or rash. Comments: Multiple nevi and telangiectasias over the back and abdomen   Neurological:      General: No focal deficit present. Mental Status: He is alert and oriented to person, place, and time. Cranial Nerves: No cranial nerve deficit.       Motor: No abnormal muscle tone. Coordination: Coordination normal.      Deep Tendon Reflexes: Reflexes are normal and symmetric. Psychiatric:         Mood and Affect: Mood normal. Mood is not depressed. Speech: Speech normal.         Behavior: Behavior normal.         Thought Content: Thought content normal.         Judgment: Judgment normal.       No flowsheet data found.     Lab Results   Component Value Date/Time    CHOL 214 10/21/2021 01:45 PM    CHOL 140 11/06/2020 02:20 PM    CHOL 192 10/17/2019 01:58 PM    CHOL 224 08/04/2017 12:00 AM    CHOL 196 07/16/2015 12:00 AM    CHOL 194 06/27/2014 12:00 AM    TRIG 220 10/21/2021 01:45 PM    TRIG 108 11/06/2020 02:20 PM    TRIG 155 10/17/2019 01:58 PM    HDL 46 10/21/2021 01:45 PM    HDL 35 11/06/2020 02:20 PM    HDL 42 10/17/2019 01:58 PM    LDLCALC 124 10/21/2021 01:45 PM    LDLCALC 83 11/06/2020 02:20 PM    LDLCALC 119 10/17/2019 01:58 PM    GLUCOSE 92 10/21/2021 01:45 PM    GLUCOSE Negative 10/21/2021 01:45 PM       The 10-year ASCVD risk score (Devora VANCE, et al., 2019) is: 6.6%    Values used to calculate the score:      Age: 45 years      Sex: Male      Is Non- : No      Diabetic: No      Tobacco smoker: No      Systolic Blood Pressure: 374 mmHg      Is BP treated: Yes      HDL Cholesterol: 46 mg/dL      Total Cholesterol: 214 mg/dL    Immunization History   Administered Date(s) Administered    COVID-19, PFIZER PURPLE top, DILUTE for use, (age 15 y+), 30mcg/0.3mL 09/29/2021, 10/19/2021    Influenza Virus Vaccine 10/14/2019, 10/13/2020, 10/25/2022    Influenza, FLUARIX, FLULAVAL, FLUZONE (age 10 mo+) AND AFLURIA, (age 1 y+), PF, 0.5mL 09/23/2021    Tdap (Boostrix, Adacel) 08/02/2017       Health Maintenance   Topic Date Due    Shingles vaccine (1 of 2) Never done    COVID-19 Vaccine (3 - Booster for Pfizer series) 12/14/2021    Lipids  10/21/2022    Colorectal Cancer Screen  11/30/2023    Depression Screen  03/10/2024    DTaP/Tdap/Td vaccine (2 - Td or Tdap) 08/02/2027    Flu vaccine  Completed    Hepatitis C screen  Completed    HIV screen  Completed    Hepatitis A vaccine  Aged Out    Hib vaccine  Aged Out    Meningococcal (ACWY) vaccine  Aged Out    Pneumococcal 0-64 years Vaccine  Aged Out       Assessment & Plan     Annual physical exam  Essential hypertension  -     Comprehensive Metabolic Panel; Future  -     valsartan-hydroCHLOROthiazide (DIOVAN-HCT) 160-12.5 MG per tablet; Take 1.5 tablets by mouth daily, Disp-135 tablet, R-3Requesting 1 year supplyNormal  Hyperlipidemia, unspecified hyperlipidemia type  -     Lipid Panel; Future  -     Comprehensive Metabolic Panel; Future  -     atorvastatin (LIPITOR) 20 MG tablet; Take 1 tablet by mouth daily, Disp-90 tablet, R-3Requesting 1 year supplyNormal  Class 1 obesity due to excess calories with serious comorbidity and body mass index (BMI) of 30.0 to 30.9 in adult  Seasonal allergic rhinitis due to pollen  Chronic cough  Anxiety  History of anemia  -     CBC; Future  -     Iron and TIBC; Future  -     Ferritin; Future  History of hematuria  -     Urinalysis with Reflex to Culture; Future  Lumbar back pain with radiculopathy affecting right lower extremity  History of dysplastic nevus  Cold extremities  -     TSH; Future  Family history of thyroid disease  Screening PSA (prostate specific antigen)  -     PSA Screening;  Future      Continue current medications  Obtain labs as ordered  Daily exercise and healthy diet encouraged  Monitor blood pressure occasionally   Continue statin  Weight reduction encouraged  Continue antihistamine as needed   Return to allergist for evaluation if allergy symptoms persist  Consider spirometry/PFTs if cough worsens  Continue Celexa  Obtain labs as ordered for reevaluation of anemia  Obtain urinalysis  Monitor lumbar back pain for worsening and consider physical therapy if symptoms worsen  Yearly skin check with dermatology recommended  Obtain thyroid labs   Shingles vaccine recommended  COVID-19 vaccine recommended  Colorectal cancer screening due in the fall 2023  Call with concerns          Return in about 1 year (around 3/10/2024) for annual physical.         --Carrie Villar MD

## 2023-05-11 LAB
HCT VFR BLD CALC: 38.6 % (ref 40–51)
HEMOGLOBIN: 13.2 G/DL (ref 13.5–17)
MCH RBC QN AUTO: 31.4 PG (ref 25–33)
MCHC RBC AUTO-ENTMCNC: 34.2 G/DL (ref 31–36)
MCV RBC AUTO: 91.7 FL (ref 80–99)
PDW BLD-RTO: 13 % (ref 11.5–15)
PLATELETS: 208 K/UL (ref 130–400)
PMV BLD AUTO: 10.9 FL (ref 9.3–13)
RBC: 4.21 M/UL (ref 4.5–6.1)
WBC: 6.1 K/UL (ref 3.5–11)

## 2023-05-12 LAB
ALBUMIN SERPL-MCNC: 4.7 G/DL (ref 3.5–5.2)
ALK PHOSPHATASE: 70 U/L (ref 40–121)
ALT SERPL-CCNC: 33 U/L (ref 5–50)
ANION GAP SERPL CALCULATED.3IONS-SCNC: 12 MEQ/L (ref 7–16)
APPEARANCE: CLEAR
AST SERPL-CCNC: 26 U/L (ref 9–50)
BILIRUB SERPL-MCNC: 0.7 MG/DL
BILIRUBIN: NEGATIVE
BUN BLDV-MCNC: 16 MG/DL (ref 6–20)
CALCIUM SERPL-MCNC: 9.4 MG/DL (ref 8.5–10.5)
CHLORIDE BLD-SCNC: 103 MEQ/L (ref 95–107)
CHOLESTEROL/HDL RATIO: 5 RATIO
CHOLESTEROL: 228 MG/DL
CO2: 26 MEQ/L (ref 19–31)
COLOR: ABNORMAL
CREAT SERPL-MCNC: 0.72 MG/DL (ref 0.8–1.4)
EGFR IF NONAFRICAN AMERICAN: 109 ML/MIN/1.73
FERRITIN: 311 NG/ML (ref 30–400)
GLUCOSE BLD-MCNC: NEGATIVE MG/DL
GLUCOSE: 93 MG/DL (ref 70–99)
HDLC SERPL-MCNC: 46 MG/DL
IRON SATURATION: 30 % (ref 20–50)
IRON, SERUM: 106 UG/DL (ref 59–158)
KETONES, URINE: NEGATIVE
LDL CHOLESTEROL CALCULATED: 124 MG/DL
LDL/HDL RATIO: 2.7 RATIO
LEUKOCYTE ESTERASE, URINE: NEGATIVE
NITRITE, URINE: NEGATIVE
OCCULT BLOOD,URINE: NEGATIVE
PH: 6 (ref 5–9)
POTASSIUM SERPL-SCNC: 3.7 MEQ/L (ref 3.5–5.4)
PROTEIN, URINE: NEGATIVE
PSA, ULTRASENSITIVE: 0.4 NG/ML
SODIUM BLD-SCNC: 141 MEQ/L (ref 133–146)
SP GRAVITY MISCELLANEOUS: 1.03 (ref 1–1.03)
TOTAL IRON BINDING CAPACITY: 349 UG/DL (ref 250–450)
TOTAL PROTEIN: 7 G/DL (ref 6.1–8.3)
TRIGL SERPL-MCNC: 288 MG/DL
TSH SERPL DL<=0.05 MIU/L-ACNC: 1.82 UIU/ML (ref 0.4–4.1)
UNSATURATED IRON BINDING CAPACITY: 243 UG/DL (ref 112–347)
UROBILINOGEN, URINE: 0.2
VLDLC SERPL CALC-MCNC: 58 MG/DL

## 2023-08-07 DIAGNOSIS — F41.9 ANXIETY: ICD-10-CM

## 2023-08-07 NOTE — TELEPHONE ENCOUNTER
LOV 3-10-23   RTO   LRF 8-30-22          Controlled Substance Monitoring:    Acute and Chronic Pain Monitoring:   No flowsheet data found.

## 2023-08-08 RX ORDER — CITALOPRAM 20 MG/1
20 TABLET ORAL DAILY
Qty: 90 TABLET | Refills: 3 | Status: SHIPPED | OUTPATIENT
Start: 2023-08-08 | End: 2024-08-07

## 2023-08-30 DIAGNOSIS — I10 ESSENTIAL HYPERTENSION: ICD-10-CM

## 2023-08-30 RX ORDER — VALSARTAN AND HYDROCHLOROTHIAZIDE 160; 12.5 MG/1; MG/1
1.5 TABLET, FILM COATED ORAL DAILY
Qty: 135 TABLET | Refills: 3 | Status: SHIPPED | OUTPATIENT
Start: 2023-08-30

## 2023-08-30 NOTE — TELEPHONE ENCOUNTER
LOV: 3/10/2023    RTO: No future appointments. Patient is due back for his annual in March 2024    LRF: 3/10/23          Controlled Substance Monitoring:    Acute and Chronic Pain Monitoring:   No flowsheet data found.

## 2024-03-13 ENCOUNTER — OFFICE VISIT (OUTPATIENT)
Dept: FAMILY MEDICINE CLINIC | Age: 54
End: 2024-03-13
Payer: COMMERCIAL

## 2024-03-13 VITALS
HEART RATE: 71 BPM | SYSTOLIC BLOOD PRESSURE: 124 MMHG | WEIGHT: 225 LBS | BODY MASS INDEX: 31.5 KG/M2 | DIASTOLIC BLOOD PRESSURE: 80 MMHG | TEMPERATURE: 98.3 F | HEIGHT: 71 IN | OXYGEN SATURATION: 98 %

## 2024-03-13 DIAGNOSIS — N52.9 ERECTILE DYSFUNCTION, UNSPECIFIED ERECTILE DYSFUNCTION TYPE: ICD-10-CM

## 2024-03-13 DIAGNOSIS — Z87.448 HISTORY OF HEMATURIA: ICD-10-CM

## 2024-03-13 DIAGNOSIS — Z00.00 ANNUAL PHYSICAL EXAM: Primary | ICD-10-CM

## 2024-03-13 DIAGNOSIS — Z86.2 HISTORY OF ANEMIA: ICD-10-CM

## 2024-03-13 DIAGNOSIS — E78.5 HYPERLIPIDEMIA, UNSPECIFIED HYPERLIPIDEMIA TYPE: ICD-10-CM

## 2024-03-13 DIAGNOSIS — F41.9 ANXIETY: ICD-10-CM

## 2024-03-13 DIAGNOSIS — E66.09 CLASS 1 OBESITY DUE TO EXCESS CALORIES WITH SERIOUS COMORBIDITY AND BODY MASS INDEX (BMI) OF 30.0 TO 30.9 IN ADULT: ICD-10-CM

## 2024-03-13 DIAGNOSIS — I10 ESSENTIAL HYPERTENSION: ICD-10-CM

## 2024-03-13 DIAGNOSIS — M54.16 LUMBAR BACK PAIN WITH RADICULOPATHY AFFECTING RIGHT LOWER EXTREMITY: ICD-10-CM

## 2024-03-13 DIAGNOSIS — Z83.49 FAMILY HISTORY OF THYROID DISEASE: ICD-10-CM

## 2024-03-13 DIAGNOSIS — Z12.11 SCREENING FOR COLORECTAL CANCER: ICD-10-CM

## 2024-03-13 DIAGNOSIS — Z12.12 SCREENING FOR COLORECTAL CANCER: ICD-10-CM

## 2024-03-13 DIAGNOSIS — Z12.5 SCREENING PSA (PROSTATE SPECIFIC ANTIGEN): ICD-10-CM

## 2024-03-13 DIAGNOSIS — J30.1 SEASONAL ALLERGIC RHINITIS DUE TO POLLEN: ICD-10-CM

## 2024-03-13 PROCEDURE — 3079F DIAST BP 80-89 MM HG: CPT | Performed by: PEDIATRICS

## 2024-03-13 PROCEDURE — 99396 PREV VISIT EST AGE 40-64: CPT | Performed by: PEDIATRICS

## 2024-03-13 PROCEDURE — 3074F SYST BP LT 130 MM HG: CPT | Performed by: PEDIATRICS

## 2024-03-13 PROCEDURE — 99212 OFFICE O/P EST SF 10 MIN: CPT | Performed by: PEDIATRICS

## 2024-03-13 RX ORDER — CITALOPRAM 20 MG/1
20 TABLET ORAL DAILY
Qty: 90 TABLET | Refills: 3 | Status: SHIPPED | OUTPATIENT
Start: 2024-03-13 | End: 2025-03-13

## 2024-03-13 RX ORDER — SILDENAFIL 50 MG/1
50 TABLET, FILM COATED ORAL PRN
Qty: 30 TABLET | Refills: 1 | Status: SHIPPED | OUTPATIENT
Start: 2024-03-13

## 2024-03-13 RX ORDER — VALSARTAN AND HYDROCHLOROTHIAZIDE 160; 12.5 MG/1; MG/1
1.5 TABLET, FILM COATED ORAL DAILY
Qty: 135 TABLET | Refills: 3 | Status: SHIPPED | OUTPATIENT
Start: 2024-03-13

## 2024-03-13 RX ORDER — ATORVASTATIN CALCIUM 20 MG/1
20 TABLET, FILM COATED ORAL DAILY
Qty: 90 TABLET | Refills: 3 | Status: SHIPPED | OUTPATIENT
Start: 2024-03-13 | End: 2025-03-13

## 2024-03-13 SDOH — ECONOMIC STABILITY: FOOD INSECURITY: WITHIN THE PAST 12 MONTHS, THE FOOD YOU BOUGHT JUST DIDN'T LAST AND YOU DIDN'T HAVE MONEY TO GET MORE.: NEVER TRUE

## 2024-03-13 SDOH — ECONOMIC STABILITY: HOUSING INSECURITY
IN THE LAST 12 MONTHS, WAS THERE A TIME WHEN YOU DID NOT HAVE A STEADY PLACE TO SLEEP OR SLEPT IN A SHELTER (INCLUDING NOW)?: NO

## 2024-03-13 SDOH — ECONOMIC STABILITY: FOOD INSECURITY: WITHIN THE PAST 12 MONTHS, YOU WORRIED THAT YOUR FOOD WOULD RUN OUT BEFORE YOU GOT MONEY TO BUY MORE.: NEVER TRUE

## 2024-03-13 SDOH — ECONOMIC STABILITY: INCOME INSECURITY: HOW HARD IS IT FOR YOU TO PAY FOR THE VERY BASICS LIKE FOOD, HOUSING, MEDICAL CARE, AND HEATING?: NOT HARD AT ALL

## 2024-03-13 ASSESSMENT — PATIENT HEALTH QUESTIONNAIRE - PHQ9
2. FEELING DOWN, DEPRESSED OR HOPELESS: NOT AT ALL
SUM OF ALL RESPONSES TO PHQ9 QUESTIONS 1 & 2: 0
SUM OF ALL RESPONSES TO PHQ QUESTIONS 1-9: 0
1. LITTLE INTEREST OR PLEASURE IN DOING THINGS: NOT AT ALL
SUM OF ALL RESPONSES TO PHQ QUESTIONS 1-9: 0

## 2024-03-13 NOTE — PROGRESS NOTES
3/13/2024    Tyler Chester (:  1970) is a 53 y.o. male, here for a preventive medicine evaluation.    Patient presents today for routine annual physical.  He does have a history of hypertension, hyperlipidemia, obesity, seasonal allergies, chronic cough and anxiety with some obsessive behaviors in the past.  He also has a history of intermittent anemia and a history of microhematuria.  His hypertension is treated with Diovan/HCTZ and he is tolerating this well.  He has needed to increase this to 1 1/2 tablets daily because his blood pressures were mildly elevated above 130/80.  His blood pressures are now well controlled with this dosage.    He does have hyperlipidemia that is treated with atorvastatin and he will take this about 4 times weekly.  He is tolerating this without side effects.  He does have a history of seasonal allergies previously well controlled with allergy shots.  He does still get some congestion, postnasal drip and chronic cough has been present for the last several years.  He will use an OTC allergy pill and this helps his symptoms.  He was given an albuterol inhaler in the past that did not seem to make a difference in his symptoms.   He does think some exposures at work may contribute to his symptoms too.    He does have a history of anxiety and takes Celexa for this.  This does control his symptoms well. He previously took lorazepam in the past but he has not taken this for many years and he is doing well without it.  He does have a history of very mild intermittent anemia for the last several years with no signs of active bleeding.  His last blood counts were normal.  Interestingly his mother has some type of anemia for which she requires intermittent transfusions.  He does have a history of microhematuria in the past but his last urinalysis have been negative for the last several years.  He did have an episode of acute low back pain with radiation down the right leg in .

## 2024-03-19 ASSESSMENT — ENCOUNTER SYMPTOMS
SINUS PRESSURE: 0
EYE DISCHARGE: 0
VOMITING: 0
BLOOD IN STOOL: 0
CONSTIPATION: 0
WHEEZING: 0
ABDOMINAL PAIN: 0
BACK PAIN: 1
CHEST TIGHTNESS: 0
RHINORRHEA: 0
EYE ITCHING: 0
CHOKING: 0
STRIDOR: 0
TROUBLE SWALLOWING: 0
COLOR CHANGE: 0
DIARRHEA: 0
VOICE CHANGE: 0
EYE PAIN: 0
COUGH: 1
SHORTNESS OF BREATH: 0

## 2024-05-22 LAB — NONINV COLON CA DNA+OCC BLD SCRN STL QL: NEGATIVE

## 2024-06-20 LAB
HCT VFR BLD CALC: 40 % (ref 40–51)
HEMOGLOBIN: 13.7 G/DL (ref 13.5–17)
MCH RBC QN AUTO: 32.1 PG (ref 25–33)
MCHC RBC AUTO-ENTMCNC: 34.3 G/DL (ref 31–36)
MCV RBC AUTO: 93.7 FL (ref 80–99)
PDW BLD-RTO: 12.8 % (ref 11.5–15)
PLATELET # BLD: 225 K/UL (ref 130–400)
PMV BLD AUTO: 11.2 FL (ref 9.3–13)
RBC # BLD: 4.27 M/UL (ref 4.5–6.1)
WBC # BLD: 5.7 K/UL (ref 3.5–11)

## 2024-06-21 LAB
ALBUMIN: 4.7 G/DL (ref 3.5–5.2)
ALK PHOSPHATASE: 73 U/L (ref 40–121)
ALT SERPL-CCNC: 33 U/L (ref 5–50)
ANION GAP SERPL CALCULATED.3IONS-SCNC: 11 MEQ/L (ref 7–16)
APPEARANCE: ABNORMAL
AST SERPL-CCNC: 24 U/L (ref 9–50)
BACTERIA: ABNORMAL PER HPF
BILIRUB SERPL-MCNC: 0.7 MG/DL
BILIRUB SERPL-MCNC: NEGATIVE MG/DL
BUN BLDV-MCNC: 15 MG/DL (ref 6–20)
CALCIUM SERPL-MCNC: 9.9 MG/DL (ref 8.5–10.5)
CHLORIDE BLD-SCNC: 102 MEQ/L (ref 95–107)
CHOLESTEROL, TOTAL: 212 MG/DL
CHOLESTEROL/HDL RATIO: 4.8 RATIO
CO2: 27 MEQ/L (ref 19–31)
COLOR: ABNORMAL
CREAT SERPL-MCNC: 0.81 MG/DL (ref 0.8–1.4)
EGFR IF NONAFRICAN AMERICAN: 104 ML/MIN/1.73
EPITHELIAL CELLS: ABNORMAL PER HPF (ref 0–5)
FERRITIN: 305 NG/ML (ref 30–400)
GLUCOSE BLD-MCNC: NEGATIVE MG/DL
GLUCOSE: 87 MG/DL (ref 70–99)
HDLC SERPL-MCNC: 44 MG/DL
HYALINE CASTS: ABNORMAL
IRON % SATURATION: 27 % (ref 20–50)
IRON: 97 UG/DL (ref 59–158)
KETONES, URINE: NEGATIVE
LDL CHOLESTEROL: 126 MG/DL
LDL/HDL RATIO: 2.9 RATIO
LEUKOCYTE ESTERASE, URINE: NEGATIVE
NITRITE, URINE: NEGATIVE
OCCULT BLOOD,URINE: NEGATIVE
PH: 5.5 (ref 5–9)
POTASSIUM SERPL-SCNC: 4.1 MEQ/L (ref 3.5–5.4)
PROTEIN, URINE: ABNORMAL
PSA, ULTRASENSITIVE: 0.36 NG/ML
RBC # BLD: ABNORMAL PER HPF (ref 0–5)
SODIUM BLD-SCNC: 140 MEQ/L (ref 133–146)
SP GRAVITY MISCELLANEOUS: 1.03 (ref 1–1.03)
TOTAL IRON BINDING CAPACITY: 366 UG/DL (ref 250–450)
TOTAL PROTEIN: 7.1 G/DL (ref 6.1–8.3)
TRIGL SERPL-MCNC: 212 MG/DL
TSH SERPL DL<=0.05 MIU/L-ACNC: 2.14 UIU/ML (ref 0.4–4.1)
UNSATURATED IRON BINDING CAPACITY: 269 UG/DL (ref 112–347)
UROBILINOGEN, URINE: 0.2
VLDLC SERPL CALC-MCNC: 42 MG/DL
WBC # BLD: ABNORMAL PER HPF (ref 0–5)

## 2025-02-16 ENCOUNTER — TELEPHONE (OUTPATIENT)
Dept: FAMILY MEDICINE CLINIC | Age: 55
End: 2025-02-16

## 2025-02-16 DIAGNOSIS — J45.21 MILD INTERMITTENT ASTHMA WITH ACUTE EXACERBATION: Primary | ICD-10-CM

## 2025-02-16 RX ORDER — ALBUTEROL SULFATE 90 UG/1
2 INHALANT RESPIRATORY (INHALATION) 4 TIMES DAILY PRN
Qty: 1 EACH | Refills: 1 | Status: SHIPPED | OUTPATIENT
Start: 2025-02-16

## 2025-02-16 RX ORDER — BUDESONIDE AND FORMOTEROL FUMARATE DIHYDRATE 80; 4.5 UG/1; UG/1
2 AEROSOL RESPIRATORY (INHALATION) 2 TIMES DAILY
Qty: 1 EACH | Refills: 1 | Status: SHIPPED | OUTPATIENT
Start: 2025-02-16

## 2025-03-14 ENCOUNTER — RESULTS FOLLOW-UP (OUTPATIENT)
Dept: FAMILY MEDICINE CLINIC | Age: 55
End: 2025-03-14

## 2025-03-14 ENCOUNTER — OFFICE VISIT (OUTPATIENT)
Dept: FAMILY MEDICINE CLINIC | Age: 55
End: 2025-03-14

## 2025-03-14 VITALS
DIASTOLIC BLOOD PRESSURE: 72 MMHG | OXYGEN SATURATION: 98 % | BODY MASS INDEX: 32.2 KG/M2 | SYSTOLIC BLOOD PRESSURE: 126 MMHG | HEART RATE: 86 BPM | WEIGHT: 230 LBS | HEIGHT: 71 IN

## 2025-03-14 DIAGNOSIS — F41.9 ANXIETY: ICD-10-CM

## 2025-03-14 DIAGNOSIS — R93.1 ABNORMAL SCREENING CT OF HEART: ICD-10-CM

## 2025-03-14 DIAGNOSIS — J30.2 SEASONAL ALLERGIES: ICD-10-CM

## 2025-03-14 DIAGNOSIS — N52.9 ERECTILE DYSFUNCTION, UNSPECIFIED ERECTILE DYSFUNCTION TYPE: ICD-10-CM

## 2025-03-14 DIAGNOSIS — J45.20 MILD INTERMITTENT ASTHMA WITHOUT COMPLICATION: ICD-10-CM

## 2025-03-14 DIAGNOSIS — R94.31 ABNORMAL ELECTROCARDIOGRAPHY: Primary | ICD-10-CM

## 2025-03-14 DIAGNOSIS — R05.3 CHRONIC COUGH: ICD-10-CM

## 2025-03-14 DIAGNOSIS — Z82.49 FAMILY HISTORY OF HEART DISEASE: ICD-10-CM

## 2025-03-14 DIAGNOSIS — I49.1 PAC (PREMATURE ATRIAL CONTRACTION): ICD-10-CM

## 2025-03-14 DIAGNOSIS — E66.09 CLASS 1 OBESITY DUE TO EXCESS CALORIES WITH SERIOUS COMORBIDITY AND BODY MASS INDEX (BMI) OF 32.0 TO 32.9 IN ADULT: ICD-10-CM

## 2025-03-14 DIAGNOSIS — Z83.49 FAMILY HISTORY OF THYROID DISEASE: ICD-10-CM

## 2025-03-14 DIAGNOSIS — Z00.00 ANNUAL PHYSICAL EXAM: Primary | ICD-10-CM

## 2025-03-14 DIAGNOSIS — Z12.5 SCREENING PSA (PROSTATE SPECIFIC ANTIGEN): ICD-10-CM

## 2025-03-14 DIAGNOSIS — E78.5 HYPERLIPIDEMIA, UNSPECIFIED HYPERLIPIDEMIA TYPE: ICD-10-CM

## 2025-03-14 DIAGNOSIS — Z87.448 HISTORY OF HEMATURIA: ICD-10-CM

## 2025-03-14 DIAGNOSIS — Z86.2 HISTORY OF ANEMIA: ICD-10-CM

## 2025-03-14 DIAGNOSIS — I49.9 IRREGULAR HEART RATE: ICD-10-CM

## 2025-03-14 DIAGNOSIS — I10 ESSENTIAL HYPERTENSION: ICD-10-CM

## 2025-03-14 DIAGNOSIS — E66.811 CLASS 1 OBESITY DUE TO EXCESS CALORIES WITH SERIOUS COMORBIDITY AND BODY MASS INDEX (BMI) OF 32.0 TO 32.9 IN ADULT: ICD-10-CM

## 2025-03-14 RX ORDER — ATORVASTATIN CALCIUM 20 MG/1
20 TABLET, FILM COATED ORAL DAILY
Qty: 90 TABLET | Refills: 3 | Status: SHIPPED | OUTPATIENT
Start: 2025-03-14 | End: 2026-03-14

## 2025-03-14 RX ORDER — CITALOPRAM HYDROBROMIDE 20 MG/1
20 TABLET ORAL DAILY
Qty: 90 TABLET | Refills: 3 | Status: SHIPPED | OUTPATIENT
Start: 2025-03-14 | End: 2026-03-14

## 2025-03-14 RX ORDER — VALSARTAN AND HYDROCHLOROTHIAZIDE 160; 12.5 MG/1; MG/1
1.5 TABLET, FILM COATED ORAL DAILY
Qty: 135 TABLET | Refills: 3 | Status: SHIPPED | OUTPATIENT
Start: 2025-03-14

## 2025-03-14 RX ORDER — SILDENAFIL 50 MG/1
50 TABLET, FILM COATED ORAL PRN
Qty: 30 TABLET | Refills: 1 | Status: SHIPPED | OUTPATIENT
Start: 2025-03-14

## 2025-03-14 SDOH — ECONOMIC STABILITY: FOOD INSECURITY: WITHIN THE PAST 12 MONTHS, THE FOOD YOU BOUGHT JUST DIDN'T LAST AND YOU DIDN'T HAVE MONEY TO GET MORE.: NEVER TRUE

## 2025-03-14 SDOH — ECONOMIC STABILITY: FOOD INSECURITY: WITHIN THE PAST 12 MONTHS, YOU WORRIED THAT YOUR FOOD WOULD RUN OUT BEFORE YOU GOT MONEY TO BUY MORE.: NEVER TRUE

## 2025-03-14 ASSESSMENT — ENCOUNTER SYMPTOMS
DIARRHEA: 0
EYE PAIN: 0
EYE ITCHING: 0
WHEEZING: 0
BACK PAIN: 0
TROUBLE SWALLOWING: 0
ABDOMINAL PAIN: 0
EYE DISCHARGE: 0
COLOR CHANGE: 0
STRIDOR: 0
CHOKING: 0
SINUS PRESSURE: 0
VOICE CHANGE: 0
CHEST TIGHTNESS: 0
COUGH: 0
CONSTIPATION: 0
NAUSEA: 0
SHORTNESS OF BREATH: 0
VOMITING: 0
RHINORRHEA: 0
BLOOD IN STOOL: 0

## 2025-03-14 ASSESSMENT — PATIENT HEALTH QUESTIONNAIRE - PHQ9
SUM OF ALL RESPONSES TO PHQ QUESTIONS 1-9: 0
1. LITTLE INTEREST OR PLEASURE IN DOING THINGS: NOT AT ALL
2. FEELING DOWN, DEPRESSED OR HOPELESS: NOT AT ALL
SUM OF ALL RESPONSES TO PHQ QUESTIONS 1-9: 0

## 2025-03-14 NOTE — PROGRESS NOTES
3/14/2025    Tyler Chester (:  1970) is a 54 y.o. male, here for a preventive medicine evaluation.      Patient presents today for routine annual physical.    He does have a history of hypertension, hyperlipidemia, obesity, seasonal allergies, chronic cough and anxiety with some obsessive behaviors in the past.  He also has a history of intermittent anemia and a history of microhematuria.  His hypertension is treated with Diovan/HCTZ and he is tolerating this well.  He is taking 1 1/2 tablets daily and his blood pressures are well controlled with this dosage.    He does have hyperlipidemia that is treated with atorvastatin and he will take 2 tabs 2-3 times weekly.  He is tolerating this without side effects.  He does have a history of obesity and has gained a few pounds since his last visit.    He does have a history of seasonal allergies previously well controlled with allergy shots.  He does still get some congestion, postnasal drip and chronic cough has been present for the last several years.  He will use an OTC allergy pill and this helps his symptoms.  He has been contemplating going back to the allergist to see about allergy shots again.  He did develop worsening of his chronic cough recently after having an upper respiratory infection.  He does have a remote history of asthma and I suspected he was having an asthma flare.  [facility] and he used this for about 3-4 weeks and did have improvement in his symptoms.  I also gave him and albuterol inhaler and he does use this in the morning and feels like this is helpful.  I did recommend he get a chest xray for further evaluation of his chronic cough.  I will also consider PFTs if his cough persists.  He does think some exposures at work may contribute to his symptoms too.  He has a pamphlet for ENT in [place] and he is going to call them to see if they will see him for his allergies.  I did tell him that I suspect he will need

## 2025-03-14 NOTE — ASSESSMENT & PLAN NOTE
Orders:    atorvastatin (LIPITOR) 20 MG tablet; Take 1 tablet by mouth daily    Lipid Panel; Future    Comprehensive Metabolic Panel; Future    Echo (TTE) complete (PRN contrast/bubble/strain/3D); Future    CT CARDIAC CALCIUM SCORING; Future

## 2025-03-25 DIAGNOSIS — R05.3 CHRONIC COUGH: ICD-10-CM

## 2025-03-26 DIAGNOSIS — R05.3 CHRONIC COUGH: ICD-10-CM

## 2025-03-26 DIAGNOSIS — I10 ESSENTIAL HYPERTENSION: ICD-10-CM

## 2025-03-26 DIAGNOSIS — I49.1 PAC (PREMATURE ATRIAL CONTRACTION): ICD-10-CM

## 2025-03-26 DIAGNOSIS — Z82.49 FAMILY HISTORY OF HEART DISEASE: ICD-10-CM

## 2025-03-26 DIAGNOSIS — I49.9 IRREGULAR HEART RATE: ICD-10-CM

## 2025-03-26 DIAGNOSIS — E78.5 HYPERLIPIDEMIA, UNSPECIFIED HYPERLIPIDEMIA TYPE: ICD-10-CM

## 2025-04-11 DIAGNOSIS — R05.3 CHRONIC COUGH: ICD-10-CM

## 2025-04-11 DIAGNOSIS — I49.1 PAC (PREMATURE ATRIAL CONTRACTION): ICD-10-CM

## 2025-04-11 DIAGNOSIS — I10 ESSENTIAL HYPERTENSION: ICD-10-CM

## 2025-04-11 DIAGNOSIS — E78.5 HYPERLIPIDEMIA, UNSPECIFIED HYPERLIPIDEMIA TYPE: ICD-10-CM

## 2025-04-11 DIAGNOSIS — Z82.49 FAMILY HISTORY OF HEART DISEASE: ICD-10-CM

## 2025-04-11 DIAGNOSIS — I49.9 IRREGULAR HEART RATE: ICD-10-CM

## 2025-04-17 NOTE — TELEPHONE ENCOUNTER
Patient and his wife called back for results per Dr. ACEVES     CT cardiac calcium scoring scan does show an increased calcium score indicating some calcifications in 3 of the heart arteries.  These elevated levels can indicate significant heart disease.  Because of this elevated score I do recommend a nuclear stress test to evaluate further for any significant heart disease. Recommend obtain nuclear stress test.  After review of this test I will consider having him see a cardiologist.   Please resend to me once he or his wife is aware of results.  I will sign the order then (so they do not get a call to schedule before we give them results.      Patient or his wife had any questions understood results     CT CARDIAC CALCIUM SCORING

## 2025-05-15 DIAGNOSIS — R93.1 ABNORMAL SCREENING CT OF HEART: ICD-10-CM

## 2025-05-15 DIAGNOSIS — Z82.49 FAMILY HISTORY OF HEART DISEASE: ICD-10-CM

## 2025-05-15 DIAGNOSIS — I49.1 PAC (PREMATURE ATRIAL CONTRACTION): ICD-10-CM

## 2025-05-15 DIAGNOSIS — I10 ESSENTIAL HYPERTENSION: ICD-10-CM

## 2025-05-15 DIAGNOSIS — E78.5 HYPERLIPIDEMIA, UNSPECIFIED HYPERLIPIDEMIA TYPE: ICD-10-CM

## 2025-05-15 DIAGNOSIS — R94.31 ABNORMAL ELECTROCARDIOGRAPHY: ICD-10-CM

## 2025-05-19 ENCOUNTER — RESULTS FOLLOW-UP (OUTPATIENT)
Dept: FAMILY MEDICINE CLINIC | Age: 55
End: 2025-05-19

## 2025-05-19 DIAGNOSIS — E78.5 HYPERLIPIDEMIA, UNSPECIFIED HYPERLIPIDEMIA TYPE: ICD-10-CM

## 2025-05-19 DIAGNOSIS — Z82.49 FAMILY HISTORY OF HEART DISEASE: ICD-10-CM

## 2025-05-19 DIAGNOSIS — R94.31 ABNORMAL EKG: ICD-10-CM

## 2025-05-19 DIAGNOSIS — R93.1 ABNORMAL SCREENING CT OF HEART: ICD-10-CM

## 2025-05-19 DIAGNOSIS — I10 ESSENTIAL HYPERTENSION: ICD-10-CM

## 2025-05-19 DIAGNOSIS — R94.39 ABNORMAL STRESS TEST: Primary | ICD-10-CM

## 2025-05-19 DIAGNOSIS — I49.1 PAC (PREMATURE ATRIAL CONTRACTION): ICD-10-CM

## 2025-06-23 ENCOUNTER — TELEPHONE (OUTPATIENT)
Dept: FAMILY MEDICINE CLINIC | Age: 55
End: 2025-06-23

## 2025-06-23 NOTE — TELEPHONE ENCOUNTER
Miriam from [facility] called in asking if we can send over EKG from march since they cannot open the midmark attachment in epic. Faxed to [phone].

## 2025-06-27 ENCOUNTER — RESULTS FOLLOW-UP (OUTPATIENT)
Dept: FAMILY MEDICINE CLINIC | Age: 55
End: 2025-06-27

## 2025-06-27 LAB
ALBUMIN: 4.5 G/DL (ref 3.5–5.2)
ALK PHOSPHATASE: 77 U/L (ref 39–118)
ALT SERPL-CCNC: 40 U/L (ref 5–41)
ANION GAP SERPL CALCULATED.3IONS-SCNC: 10 MMOL/L (ref 7–16)
APPEARANCE: CLEAR
AST SERPL-CCNC: 26 U/L (ref 9–50)
BILIRUB SERPL-MCNC: 0.6 MG/DL
BILIRUB SERPL-MCNC: NEGATIVE MG/DL
BUN BLDV-MCNC: 16 MG/DL (ref 6–20)
CALCIUM SERPL-MCNC: 9.5 MG/DL (ref 8.6–10.5)
CHLORIDE BLD-SCNC: 105 MMOL/L (ref 96–107)
CHOLESTEROL, TOTAL: 203 MG/DL (ref 100–199)
CHOLESTEROL/HDL RATIO: 4.6 (ref 2–4.5)
CO2: 27 MMOL/L (ref 18–32)
COLOR, UA: NORMAL
CREAT SERPL-MCNC: 0.76 MG/DL (ref 0.67–1.3)
EGFR IF NONAFRICAN AMERICAN: 105 ML/MIN/1.73M2
FERRITIN: 296 NG/ML (ref 30–400)
GLUCOSE BLD-MCNC: NEGATIVE MG/DL
GLUCOSE: 86 MG/DL (ref 70–100)
HCT VFR BLD CALC: 39.5 % (ref 39–52)
HDLC SERPL-MCNC: 44 MG/DL
HEMOGLOBIN: 13.1 G/DL (ref 13–18)
IRON % SATURATION: 26 % (ref 13–45)
IRON: 94 UG/DL (ref 59–158)
KETONES, URINE: NEGATIVE
LDL CHOLESTEROL: 116 MG/DL
LDL/HDL RATIO: 2.6
LEUKOCYTE ESTERASE, URINE: NEGATIVE
MCH RBC QN AUTO: 31.6 PG (ref 26–32)
MCHC RBC AUTO-ENTMCNC: 33.2 G/DL (ref 32–35)
MCV RBC AUTO: 95 FL (ref 75–100)
NITRITE, URINE: NEGATIVE
OCCULT BLOOD,URINE: NEGATIVE
PDW BLD-RTO: 12.5 % (ref 11.2–14.8)
PH: 5.5 (ref 5–8)
PLATELET # BLD: 196 THOUS/CMM (ref 140–440)
POTASSIUM SERPL-SCNC: 4.2 MMOL/L (ref 3.5–5.4)
PROTEIN, URINE: NEGATIVE
PSA, ULTRASENSITIVE: 0.41 NG/ML (ref 0–4)
RBC # BLD: 4.14 MILL/CMM (ref 4.4–6.1)
SODIUM BLD-SCNC: 142 MMOL/L (ref 135–148)
SP GRAVITY MISCELLANEOUS: 1.02 (ref 1–1.03)
TOTAL IRON BINDING CAPACITY: 355 UG/DL (ref 250–450)
TOTAL PROTEIN: 6.8 G/DL (ref 6–8.3)
TRIGL SERPL-MCNC: 216 MG/DL (ref 20–149)
TSH SERPL DL<=0.05 MIU/L-ACNC: 2.43 UIU/ML (ref 0.27–4.2)
UNSATURATED IRON BINDING CAPACITY: 261 UG/DL (ref 112–347)
UROBILINOGEN, URINE: 0.2 MG/DL
VLDLC SERPL CALC-MCNC: 43 MG/DL
WBC # BLD: 6 THDS/CMM (ref 3.6–11)